# Patient Record
Sex: FEMALE | Race: WHITE | URBAN - METROPOLITAN AREA
[De-identification: names, ages, dates, MRNs, and addresses within clinical notes are randomized per-mention and may not be internally consistent; named-entity substitution may affect disease eponyms.]

---

## 2023-05-24 ENCOUNTER — HOSPITAL ENCOUNTER (OUTPATIENT)
Age: 63
Setting detail: SPECIMEN
Discharge: HOME | End: 2023-05-24

## 2023-05-24 LAB
BASOPHILS # BLD: 0.05 K/UL (ref 0–0.2)
BASOPHILS NFR BLD: 1 % (ref 0–2)
BILIRUB UR QL STRIP: NEGATIVE
CLARITY UR: CLEAR
COLOR UR: YELLOW
COMMENT UA: ABNORMAL
EOSINOPHIL # BLD: 0.17 K/UL (ref 0–0.44)
EOSINOPHILS RELATIVE PERCENT: 2 % (ref 1–4)
ERYTHROCYTE [DISTWIDTH] IN BLOOD BY AUTOMATED COUNT: 14.2 % (ref 11.8–14.4)
GLUCOSE UR STRIP-MCNC: NEGATIVE MG/DL
HCT VFR BLD AUTO: 43.7 % (ref 36.3–47.1)
HGB BLD-MCNC: 13.6 G/DL (ref 11.9–15.1)
HGB UR QL STRIP.AUTO: NEGATIVE
IMM GRANULOCYTES # BLD AUTO: 0.03 K/UL (ref 0–0.3)
IMM GRANULOCYTES NFR BLD: 0 %
KETONES UR STRIP-MCNC: NEGATIVE MG/DL
LEUKOCYTE ESTERASE UR QL STRIP: NEGATIVE
LYMPHOCYTES # BLD: 30 % (ref 24–43)
LYMPHOCYTES NFR BLD: 2.25 K/UL (ref 1.1–3.7)
MCH RBC QN AUTO: 29.5 PG (ref 25.2–33.5)
MCHC RBC AUTO-ENTMCNC: 31.1 G/DL (ref 28.4–34.8)
MCV RBC AUTO: 94.8 FL (ref 82.6–102.9)
MONOCYTES NFR BLD: 0.68 K/UL (ref 0.1–1.2)
MONOCYTES NFR BLD: 9 % (ref 3–12)
NEUTROPHILS NFR BLD: 58 % (ref 36–65)
NEUTS SEG NFR BLD: 4.31 K/UL (ref 1.5–8.1)
NITRITE UR QL STRIP: NEGATIVE
NRBC AUTOMATED: 0 PER 100 WBC
PH UR STRIP: 8 [PH] (ref 5–8)
PLATELET # BLD AUTO: 301 K/UL (ref 138–453)
PMV BLD AUTO: 11.5 FL (ref 8.1–13.5)
PROT UR STRIP-MCNC: NEGATIVE MG/DL
RBC # BLD AUTO: 4.61 M/UL (ref 3.95–5.11)
SP GR UR STRIP: 1 (ref 1–1.03)
UROBILINOGEN UR STRIP-ACNC: NORMAL
WBC OTHER # BLD: 7.5 K/UL (ref 3.5–11.3)

## 2023-05-25 LAB
ALBUMIN SERPL-MCNC: 4.6 G/DL (ref 3.5–5.2)
ALBUMIN/GLOB SERPL: 1.3 {RATIO} (ref 1–2.5)
ALP SERPL-CCNC: 78 U/L (ref 35–104)
ALT SERPL-CCNC: 36 U/L (ref 5–33)
ANION GAP SERPL CALCULATED.3IONS-SCNC: 22 MMOL/L (ref 9–17)
AST SERPL-CCNC: 35 U/L
BILIRUB SERPL-MCNC: 0.4 MG/DL (ref 0.3–1.2)
BUN SERPL-MCNC: 12 MG/DL (ref 8–23)
CALCIUM SERPL-MCNC: 10.3 MG/DL (ref 8.6–10.4)
CHLORIDE SERPL-SCNC: 106 MMOL/L (ref 98–107)
CO2 SERPL-SCNC: 18 MMOL/L (ref 20–31)
CREAT SERPL-MCNC: 0.72 MG/DL (ref 0.5–0.9)
GFR SERPL CREATININE-BSD FRML MDRD: >60 ML/MIN/1.73M2
GLUCOSE SERPL-MCNC: 86 MG/DL (ref 70–99)
POTASSIUM SERPL-SCNC: 4.8 MMOL/L (ref 3.7–5.3)
PROT SERPL-MCNC: 8.2 G/DL (ref 6.4–8.3)
SODIUM SERPL-SCNC: 146 MMOL/L (ref 135–144)

## 2023-07-10 ENCOUNTER — OFFICE VISIT (OUTPATIENT)
Age: 63
End: 2023-07-10
Payer: COMMERCIAL

## 2023-07-10 DIAGNOSIS — R35.0 FREQUENCY OF URINATION: ICD-10-CM

## 2023-07-10 DIAGNOSIS — N39.41 URGE INCONTINENCE OF URINE: Primary | ICD-10-CM

## 2023-07-10 LAB
BILIRUBIN, POC: NORMAL
BLOOD URINE, POC: NORMAL
CLARITY, POC: CLEAR
COLOR, POC: YELLOW
GLUCOSE URINE, POC: NORMAL
KETONES, POC: NORMAL
LEUKOCYTE EST, POC: NORMAL
NITRITE, POC: NORMAL
PH, POC: NORMAL
POST VOID RESIDUAL (PVR): 0 ML
PROTEIN, POC: NORMAL
SPECIFIC GRAVITY, POC: NORMAL
UROBILINOGEN, POC: NORMAL

## 2023-07-10 PROCEDURE — 51798 US URINE CAPACITY MEASURE: CPT | Performed by: SPECIALIST

## 2023-07-10 PROCEDURE — 99204 OFFICE O/P NEW MOD 45 MIN: CPT | Performed by: SPECIALIST

## 2023-07-10 PROCEDURE — 81003 URINALYSIS AUTO W/O SCOPE: CPT | Performed by: SPECIALIST

## 2023-07-10 RX ORDER — BUPROPION HYDROCHLORIDE 150 MG/1
1 TABLET ORAL EVERY MORNING
COMMUNITY
Start: 2022-06-15

## 2023-07-10 RX ORDER — GUAIFENESIN, PSEUDOEPHEDRINE HYDROCHLORIDE 600; 60 MG/1; MG/1
TABLET, EXTENDED RELEASE ORAL
COMMUNITY

## 2023-07-10 RX ORDER — OXYBUTYNIN CHLORIDE 5 MG/1
TABLET ORAL
COMMUNITY
Start: 2023-06-08 | End: 2023-07-10

## 2023-07-10 RX ORDER — ESTROGEN,CON/M-PROGEST ACET 0.3-1.5MG
TABLET ORAL
COMMUNITY

## 2023-07-10 NOTE — PROGRESS NOTES
Vania Lambert Godinezyriselisa, [address] [place] [address] Urology Consultation / New Patient Visit    Patient:  Xochitl Ignacio  YOB: 1960  Date: 7/10/2023  Consult requested from Marycruz Bazzi MD  for purpose of evaluation of Urge urinary incontinence. HISTORY OF PRESENT ILLNESS:   The patient is a 65 y.o. female who presents today for evaluation of the following problems:   Urge Urinary Incontinence:  Patient is here today for urge urinary incontinence which started 6 month(s) ago. Recently the urinary incontinence symptoms: are worsening  Current medical Rx for urge urinary incontinence: none  Urge Incontinence:  Severity = moderate. Stress incontinence: Severity = not present. Number of pads per day: 2-3  Lower urinary tract symptoms: urgency, frequency, hesitancy, decreased urinary stream, and incomplete emptying.    Today's AUA Symptom Score (QOL): 14 (5)  (Patient's old records have been requested, reviewed and summarized in today's note: 6/8/23 office note of Marycruz Bazzi MD)    Summary of old records:   Urge urinary incontinence: 2-3 ppd, can't tolerate oxybutynin (dry mouth, blisters); PVR = 0 mL; needs voiding diary; samples of Myrbetriq 50 mg po qd 7/10/23  6/15/23 CT neg     Procedures Today: Postvoid Residual:  Post-void Residual by bladder scanner: 0 mL (7/10/23)     Urinalysis today:  Results for POC orders placed in visit on 07/10/23   POCT Urinalysis No Micro (Auto)   Result Value Ref Range    Color, UA yellow     Clarity, UA clear     Glucose, UA POC neg     Bilirubin, UA      Ketones, UA      Spec Grav, UA      Blood, UA POC neg     pH, UA      Protein, UA POC trace     Urobilinogen, UA      Leukocytes, UA trace     Nitrite, UA neg        Last BUN and creatinine:  Lab Results   Component Value Date    BUN 12 05/24/2023     Lab Results   Component Value Date    CREATININE 0.72 05/24/2023       Last CBC:  Lab Results   Component Value Date    WBC 7.5 05/24/2023    HGB 13.6

## 2023-08-30 ENCOUNTER — OFFICE VISIT (OUTPATIENT)
Age: 63
End: 2023-08-30
Payer: COMMERCIAL

## 2023-08-30 VITALS — HEIGHT: 68 IN | BODY MASS INDEX: 31.52 KG/M2 | WEIGHT: 208 LBS

## 2023-08-30 DIAGNOSIS — R35.0 FREQUENCY OF URINATION: ICD-10-CM

## 2023-08-30 DIAGNOSIS — N39.41 URGE INCONTINENCE OF URINE: Primary | ICD-10-CM

## 2023-08-30 LAB
BILIRUBIN, POC: NORMAL
BLOOD URINE, POC: NORMAL
CLARITY, POC: CLEAR
COLOR, POC: YELLOW
GLUCOSE URINE, POC: NORMAL
KETONES, POC: NORMAL
LEUKOCYTE EST, POC: NORMAL
NITRITE, POC: NORMAL
PH, POC: NORMAL
PROTEIN, POC: NORMAL
SPECIFIC GRAVITY, POC: NORMAL
UROBILINOGEN, POC: NORMAL

## 2023-08-30 PROCEDURE — 99213 OFFICE O/P EST LOW 20 MIN: CPT | Performed by: SPECIALIST

## 2023-08-30 PROCEDURE — 81003 URINALYSIS AUTO W/O SCOPE: CPT | Performed by: SPECIALIST

## 2023-08-30 RX ORDER — METRONIDAZOLE 500 MG/1
500 TABLET ORAL 2 TIMES DAILY
Qty: 14 TABLET | Refills: 0 | COMMUNITY
Start: 2023-08-29 | End: 2023-09-05

## 2023-08-30 RX ORDER — CLOTRIMAZOLE AND BETAMETHASONE DIPROPIONATE 10; .64 MG/G; MG/G
CREAM TOPICAL 2 TIMES DAILY
COMMUNITY
Start: 2023-08-11

## 2023-08-30 RX ORDER — ESTRADIOL 10 UG/1
INSERT VAGINAL
COMMUNITY
Start: 2023-08-11

## 2023-08-30 RX ORDER — FLUCONAZOLE 200 MG/1
200 TABLET ORAL EVERY OTHER DAY
COMMUNITY
Start: 2023-08-29 | End: 2023-09-03

## 2023-08-30 RX ORDER — L. ACIDOPHILUS/PECTIN, CITRUS 25MM-100MG
TABLET ORAL
COMMUNITY

## 2023-11-16 ENCOUNTER — OFFICE VISIT (OUTPATIENT)
Age: 63
End: 2023-11-16

## 2023-11-16 VITALS
RESPIRATION RATE: 14 BRPM | DIASTOLIC BLOOD PRESSURE: 80 MMHG | SYSTOLIC BLOOD PRESSURE: 120 MMHG | TEMPERATURE: 98.2 F | OXYGEN SATURATION: 97 % | BODY MASS INDEX: 32.42 KG/M2 | HEART RATE: 70 BPM | WEIGHT: 210.13 LBS

## 2023-11-16 DIAGNOSIS — B35.4 TINEA CORPORIS: Primary | ICD-10-CM

## 2023-11-16 SDOH — ECONOMIC STABILITY: FOOD INSECURITY: WITHIN THE PAST 12 MONTHS, THE FOOD YOU BOUGHT JUST DIDN'T LAST AND YOU DIDN'T HAVE MONEY TO GET MORE.: NEVER TRUE

## 2023-11-16 SDOH — ECONOMIC STABILITY: INCOME INSECURITY: HOW HARD IS IT FOR YOU TO PAY FOR THE VERY BASICS LIKE FOOD, HOUSING, MEDICAL CARE, AND HEATING?: NOT HARD AT ALL

## 2023-11-16 SDOH — ECONOMIC STABILITY: FOOD INSECURITY: WITHIN THE PAST 12 MONTHS, YOU WORRIED THAT YOUR FOOD WOULD RUN OUT BEFORE YOU GOT MONEY TO BUY MORE.: NEVER TRUE

## 2023-11-16 SDOH — ECONOMIC STABILITY: HOUSING INSECURITY
IN THE LAST 12 MONTHS, WAS THERE A TIME WHEN YOU DID NOT HAVE A STEADY PLACE TO SLEEP OR SLEPT IN A SHELTER (INCLUDING NOW)?: NO

## 2023-11-16 ASSESSMENT — PATIENT HEALTH QUESTIONNAIRE - PHQ9
SUM OF ALL RESPONSES TO PHQ QUESTIONS 1-9: 0
1. LITTLE INTEREST OR PLEASURE IN DOING THINGS: 0
SUM OF ALL RESPONSES TO PHQ QUESTIONS 1-9: 0
SUM OF ALL RESPONSES TO PHQ9 QUESTIONS 1 & 2: 0
2. FEELING DOWN, DEPRESSED OR HOPELESS: 0

## 2023-11-16 NOTE — PROGRESS NOTES
will  apply Loprox cream twice a day until resolved and then as needed basis. Keep area dry after sports activity    COMMUNICATION:       Electronically signed by Laya Raza MD on 11/16/2023 at 4:31 PM    Portion of this note were dictated using Dragon speech recognition software. It has been reviewed for accuracy, but may contain grammatical and clerical errors.

## 2024-01-02 ENCOUNTER — TELEPHONE (OUTPATIENT)
Age: 64
End: 2024-01-02

## 2024-01-02 NOTE — TELEPHONE ENCOUNTER
50 mg is a the highest dose of Myrbetriq.  Alternatives are anticholinergic overactive bladder meds but these caused a lot of side effects.  Could consider Cystoscopy and transvesical Botox or the Interstim sacral neuromodulation device.  Can set up OV to discuss.

## 2024-01-02 NOTE — TELEPHONE ENCOUNTER
Pt called into office today asking if there was a stronger dose of mirabegron (MYRBETRIQ) 50 MG due to her starting SPROVATO treatment which makes her OAB symptoms worse. I explained 50 mg was the highest dose please advise

## 2024-02-01 ENCOUNTER — OFFICE VISIT (OUTPATIENT)
Age: 64
End: 2024-02-01
Payer: COMMERCIAL

## 2024-02-01 VITALS — WEIGHT: 210 LBS | BODY MASS INDEX: 32.96 KG/M2 | HEIGHT: 67 IN

## 2024-02-01 DIAGNOSIS — N39.41 URGE INCONTINENCE OF URINE: Primary | ICD-10-CM

## 2024-02-01 DIAGNOSIS — Z01.818 PRE-OP EVALUATION: Primary | ICD-10-CM

## 2024-02-01 DIAGNOSIS — R35.0 FREQUENCY OF URINATION: ICD-10-CM

## 2024-02-01 LAB
BILIRUBIN, POC: NORMAL
BLOOD URINE, POC: NORMAL
CLARITY, POC: CLEAR
COLOR, POC: YELLOW
GLUCOSE URINE, POC: NORMAL
KETONES, POC: NORMAL
LEUKOCYTE EST, POC: NORMAL
NITRITE, POC: NORMAL
PH, POC: NORMAL
POST VOID RESIDUAL (PVR): NORMAL ML
PROTEIN, POC: NORMAL
SPECIFIC GRAVITY, POC: NORMAL
UROBILINOGEN, POC: NORMAL

## 2024-02-01 PROCEDURE — 81003 URINALYSIS AUTO W/O SCOPE: CPT | Performed by: SPECIALIST

## 2024-02-01 PROCEDURE — 99214 OFFICE O/P EST MOD 30 MIN: CPT | Performed by: SPECIALIST

## 2024-02-01 PROCEDURE — 93000 ELECTROCARDIOGRAM COMPLETE: CPT | Performed by: SPECIALIST

## 2024-02-01 PROCEDURE — 51798 US URINE CAPACITY MEASURE: CPT | Performed by: SPECIALIST

## 2024-02-01 RX ORDER — ESKETAMINE HYDROCHLORIDE 28 MG/.2ML
SOLUTION NASAL
COMMUNITY
Start: 2023-12-17

## 2024-02-01 RX ORDER — TRAZODONE HYDROCHLORIDE 50 MG/1
50 TABLET ORAL NIGHTLY
COMMUNITY
Start: 2024-01-01

## 2024-02-01 NOTE — PROGRESS NOTES
Arun Aden MD FACS    [facility] Urology Office Progress Note    Patient:  Fabiana Ignacio  YOB: 1960  Date: 2/1/2024    HISTORY OF PRESENT ILLNESS:   The patient is a 63 y.o. female  Patient Urge urinary incontinence is worse despite Myrbetriq 50 mg po qd for OAB symptoms.  She is now taking Sporvato and she attributes the worsening to this medication.  She is now back to using 3-4 pads per day.  Will proceed with Cystoscopy and transvesical Botox (100 IU) under MAC.  Lower urinary tract symptoms: urgency, frequency, hesitancy, and nocturia, 1 times per night   Last AUA Symptom Score (QOL): 9 (4)  Today's AUA Symptom Score (QOL): 15 (4)    Summary of old records:   Urge urinary incontinence: 2-3 ppd, can't tolerate oxybutynin (dry mouth, blisters); PVR = 0 mL; Myrbetriq 50 mg po qd 7/10/23 (<1 ppd); worse after starting Sporvato; wants Botox  6/15/23 CT neg     Additional History: none    Procedures Today: Postvoid Residual:  Post-void Residual by bladder scanner: 5 mL      Urinalysis today:  Results for POC orders placed in visit on 02/01/24   POCT Urinalysis No Micro (Auto)   Result Value Ref Range    Color, UA yellow     Clarity, UA clear     Glucose, UA POC neg     Bilirubin, UA      Ketones, UA      Spec Grav, UA      Blood, UA POC neg     pH, UA      Protein, UA POC neg     Urobilinogen, UA      Leukocytes, UA trace     Nitrite, UA neg        Last BUN and creatinine:  Lab Results   Component Value Date    BUN 12 05/24/2023     Lab Results   Component Value Date    CREATININE 0.72 05/24/2023       Last CBC:  Lab Results   Component Value Date    WBC 7.5 05/24/2023    HGB 13.6 05/24/2023    HCT 43.7 05/24/2023    MCV 94.8 05/24/2023     05/24/2023       Additional Lab/Culture results: none    Imaging Reviewed during this Office Visit: none  (results were independently reviewed by physician and radiology report verified)    Physical Exam:    There were no vitals filed for

## 2024-02-16 NOTE — PROGRESS NOTES
DAY OF SURGERY/PROCEDURE  GUIDELINES    As a patient at the [facility], you can expect quality medical and nursing care that is centered on your individual needs. It is our goal to make your surgical experience as comfortable and excellent as possible.  ________________________________________________________________________    The following instructions are general guidelines, if any information on this sheet is different from what your doctor has instructed you to do, please follow your doctor's instructions.    Please arrive on 2/21/2024 2 0830      Enter through entrance C. Check in at registration     Upon arrival you will be taken to the pre-operative area to get ready for surgery, your family will stay in the waiting room and visit with you once you are ready for surgery. Due to special limitations please limit visitation to 1-2 members of your family at a time. When it is time for surgery your family will return to the waiting room.    Nothing to eat, drink, smoke, suck or chew after midnight (no water, gum, mints, cigarettes, cigars, pipes, snuff, chewing tobacco, etc.) or your surgery may be canceled.     Take a shower or bath on the morning of your surgery/procedure (Hibiclens if directed) Do not apply any lotions.    Brush your teeth, but do not swallow any water    IN CASE OF ILLNESS - If you have a cold or flu symptoms (high fever, runny nose, sore throat, cough, etc.) rash, nausea, vomiting, loose stools, and/or recent contact with someone who has a contagious disease (chick pox, measles, etc.) please call your doctor before coming to the surgery center    Take a small sip of water with heart, blood pressure, and/or seizure medication the morning of surgery.     If applicable bring your:  Inhaler (s)  Hearing aid(s)  Eyeglasses and Case (If you wear contacts they have to be removed before surgery, bring case and solution)  CPAP     DO NOT take anticoagulants (blood thinners,

## 2024-02-18 ENCOUNTER — HOSPITAL ENCOUNTER (OUTPATIENT)
Age: 64
Discharge: HOME | End: 2024-02-18
Payer: COMMERCIAL

## 2024-02-18 PROCEDURE — 93005 ELECTROCARDIOGRAM TRACING: CPT | Performed by: SPECIALIST

## 2024-02-20 ENCOUNTER — ANESTHESIA EVENT (OUTPATIENT)
Dept: OPERATING ROOM | Age: 64
End: 2024-02-20
Payer: COMMERCIAL

## 2024-02-21 ENCOUNTER — HOSPITAL ENCOUNTER (OUTPATIENT)
Age: 64
Setting detail: OUTPATIENT SURGERY
Discharge: HOME | End: 2024-02-21
Attending: SPECIALIST | Admitting: SPECIALIST
Payer: COMMERCIAL

## 2024-02-21 ENCOUNTER — ANESTHESIA (OUTPATIENT)
Dept: OPERATING ROOM | Age: 64
End: 2024-02-21
Payer: COMMERCIAL

## 2024-02-21 VITALS
SYSTOLIC BLOOD PRESSURE: 144 MMHG | WEIGHT: 208.6 LBS | RESPIRATION RATE: 12 BRPM | HEART RATE: 87 BPM | OXYGEN SATURATION: 100 % | DIASTOLIC BLOOD PRESSURE: 90 MMHG | BODY MASS INDEX: 31.61 KG/M2 | HEIGHT: 68 IN | TEMPERATURE: 96.1 F

## 2024-02-21 DIAGNOSIS — N39.41 URGE URINARY INCONTINENCE: ICD-10-CM

## 2024-02-21 PROCEDURE — 6360000002 HC RX W HCPCS: Performed by: SPECIALIST

## 2024-02-21 PROCEDURE — 7100000010 HC PHASE II RECOVERY - FIRST 15 MIN: Performed by: SPECIALIST

## 2024-02-21 PROCEDURE — 3700000000 HC ANESTHESIA ATTENDED CARE: Performed by: SPECIALIST

## 2024-02-21 PROCEDURE — 2580000003 HC RX 258: Performed by: ANESTHESIOLOGY

## 2024-02-21 PROCEDURE — 87077 CULTURE AEROBIC IDENTIFY: CPT

## 2024-02-21 PROCEDURE — 3600000012 HC SURGERY LEVEL 2 ADDTL 15MIN: Performed by: SPECIALIST

## 2024-02-21 PROCEDURE — 87086 URINE CULTURE/COLONY COUNT: CPT

## 2024-02-21 PROCEDURE — 2709999900 HC NON-CHARGEABLE SUPPLY: Performed by: SPECIALIST

## 2024-02-21 PROCEDURE — 7100000011 HC PHASE II RECOVERY - ADDTL 15 MIN: Performed by: SPECIALIST

## 2024-02-21 PROCEDURE — 3700000001 HC ADD 15 MINUTES (ANESTHESIA): Performed by: SPECIALIST

## 2024-02-21 PROCEDURE — 52287 CYSTOSCOPY CHEMODENERVATION: CPT | Performed by: SPECIALIST

## 2024-02-21 PROCEDURE — 3600000002 HC SURGERY LEVEL 2 BASE: Performed by: SPECIALIST

## 2024-02-21 PROCEDURE — 6360000002 HC RX W HCPCS: Performed by: NURSE ANESTHETIST, CERTIFIED REGISTERED

## 2024-02-21 PROCEDURE — 2500000003 HC RX 250 WO HCPCS: Performed by: NURSE ANESTHETIST, CERTIFIED REGISTERED

## 2024-02-21 RX ORDER — OXYCODONE HYDROCHLORIDE 5 MG/1
5 TABLET ORAL PRN
Status: DISCONTINUED | OUTPATIENT
Start: 2024-02-21 | End: 2024-02-21 | Stop reason: HOSPADM

## 2024-02-21 RX ORDER — METOCLOPRAMIDE HYDROCHLORIDE 5 MG/ML
10 INJECTION INTRAMUSCULAR; INTRAVENOUS
Status: DISCONTINUED | OUTPATIENT
Start: 2024-02-21 | End: 2024-02-21 | Stop reason: HOSPADM

## 2024-02-21 RX ORDER — MEPERIDINE HYDROCHLORIDE 50 MG/ML
12.5 INJECTION INTRAMUSCULAR; INTRAVENOUS; SUBCUTANEOUS ONCE
Status: DISCONTINUED | OUTPATIENT
Start: 2024-02-21 | End: 2024-02-21 | Stop reason: HOSPADM

## 2024-02-21 RX ORDER — DIPHENHYDRAMINE HYDROCHLORIDE 50 MG/ML
12.5 INJECTION INTRAMUSCULAR; INTRAVENOUS
Status: DISCONTINUED | OUTPATIENT
Start: 2024-02-21 | End: 2024-02-21 | Stop reason: HOSPADM

## 2024-02-21 RX ORDER — MORPHINE SULFATE 2 MG/ML
1 INJECTION, SOLUTION INTRAMUSCULAR; INTRAVENOUS EVERY 5 MIN PRN
Status: DISCONTINUED | OUTPATIENT
Start: 2024-02-21 | End: 2024-02-21 | Stop reason: HOSPADM

## 2024-02-21 RX ORDER — SODIUM CHLORIDE, SODIUM LACTATE, POTASSIUM CHLORIDE, CALCIUM CHLORIDE 600; 310; 30; 20 MG/100ML; MG/100ML; MG/100ML; MG/100ML
INJECTION, SOLUTION INTRAVENOUS CONTINUOUS
Status: DISCONTINUED | OUTPATIENT
Start: 2024-02-21 | End: 2024-02-21 | Stop reason: HOSPADM

## 2024-02-21 RX ORDER — CIPROFLOXACIN 2 MG/ML
INJECTION, SOLUTION INTRAVENOUS
Status: COMPLETED
Start: 2024-02-21 | End: 2024-02-21

## 2024-02-21 RX ORDER — CIPROFLOXACIN 500 MG/1
500 TABLET, FILM COATED ORAL 2 TIMES DAILY
Qty: 6 TABLET | Refills: 0 | Status: SHIPPED | OUTPATIENT
Start: 2024-02-21

## 2024-02-21 RX ORDER — SODIUM CHLORIDE 9 MG/ML
INJECTION, SOLUTION INTRAVENOUS PRN
Status: DISCONTINUED | OUTPATIENT
Start: 2024-02-21 | End: 2024-02-21 | Stop reason: HOSPADM

## 2024-02-21 RX ORDER — GLYCOPYRROLATE 1 MG/5 ML
SYRINGE (ML) INTRAVENOUS PRN
Status: DISCONTINUED | OUTPATIENT
Start: 2024-02-21 | End: 2024-02-21 | Stop reason: SDUPTHER

## 2024-02-21 RX ORDER — HYDRALAZINE HYDROCHLORIDE 20 MG/ML
10 INJECTION INTRAMUSCULAR; INTRAVENOUS
Status: DISCONTINUED | OUTPATIENT
Start: 2024-02-21 | End: 2024-02-21 | Stop reason: HOSPADM

## 2024-02-21 RX ORDER — MIDAZOLAM HYDROCHLORIDE 2 MG/2ML
2 INJECTION, SOLUTION INTRAMUSCULAR; INTRAVENOUS
Status: DISCONTINUED | OUTPATIENT
Start: 2024-02-21 | End: 2024-02-21 | Stop reason: HOSPADM

## 2024-02-21 RX ORDER — LIDOCAINE HYDROCHLORIDE 10 MG/ML
1 INJECTION, SOLUTION EPIDURAL; INFILTRATION; INTRACAUDAL; PERINEURAL
Status: DISCONTINUED | OUTPATIENT
Start: 2024-02-21 | End: 2024-02-21 | Stop reason: HOSPADM

## 2024-02-21 RX ORDER — PROPOFOL 10 MG/ML
INJECTION, EMULSION INTRAVENOUS CONTINUOUS PRN
Status: DISCONTINUED | OUTPATIENT
Start: 2024-02-21 | End: 2024-02-21 | Stop reason: SDUPTHER

## 2024-02-21 RX ORDER — CIPROFLOXACIN 2 MG/ML
400 INJECTION, SOLUTION INTRAVENOUS ONCE
Status: COMPLETED | OUTPATIENT
Start: 2024-02-21 | End: 2024-02-21

## 2024-02-21 RX ORDER — SODIUM CHLORIDE 0.9 % (FLUSH) 0.9 %
5-40 SYRINGE (ML) INJECTION EVERY 12 HOURS SCHEDULED
Status: DISCONTINUED | OUTPATIENT
Start: 2024-02-21 | End: 2024-02-21 | Stop reason: HOSPADM

## 2024-02-21 RX ORDER — LABETALOL HYDROCHLORIDE 5 MG/ML
10 INJECTION, SOLUTION INTRAVENOUS
Status: DISCONTINUED | OUTPATIENT
Start: 2024-02-21 | End: 2024-02-21 | Stop reason: HOSPADM

## 2024-02-21 RX ORDER — LIDOCAINE HYDROCHLORIDE 10 MG/ML
INJECTION, SOLUTION INFILTRATION; PERINEURAL PRN
Status: DISCONTINUED | OUTPATIENT
Start: 2024-02-21 | End: 2024-02-21 | Stop reason: SDUPTHER

## 2024-02-21 RX ORDER — PROPOFOL 10 MG/ML
INJECTION, EMULSION INTRAVENOUS PRN
Status: DISCONTINUED | OUTPATIENT
Start: 2024-02-21 | End: 2024-02-21 | Stop reason: SDUPTHER

## 2024-02-21 RX ORDER — OXYCODONE HYDROCHLORIDE 5 MG/1
10 TABLET ORAL PRN
Status: DISCONTINUED | OUTPATIENT
Start: 2024-02-21 | End: 2024-02-21 | Stop reason: HOSPADM

## 2024-02-21 RX ORDER — SODIUM CHLORIDE 0.9 % (FLUSH) 0.9 %
5-40 SYRINGE (ML) INJECTION PRN
Status: DISCONTINUED | OUTPATIENT
Start: 2024-02-21 | End: 2024-02-21 | Stop reason: HOSPADM

## 2024-02-21 RX ORDER — SODIUM CHLORIDE 9 MG/ML
INJECTION, SOLUTION INTRAMUSCULAR; INTRAVENOUS; SUBCUTANEOUS
Status: DISCONTINUED
Start: 2024-02-21 | End: 2024-02-21 | Stop reason: HOSPADM

## 2024-02-21 RX ORDER — ONDANSETRON 2 MG/ML
4 INJECTION INTRAMUSCULAR; INTRAVENOUS
Status: DISCONTINUED | OUTPATIENT
Start: 2024-02-21 | End: 2024-02-21 | Stop reason: HOSPADM

## 2024-02-21 RX ADMIN — Medication 0.2 MG: at 10:12

## 2024-02-21 RX ADMIN — LIDOCAINE HYDROCHLORIDE 50 MG: 10 INJECTION, SOLUTION INFILTRATION; PERINEURAL at 10:12

## 2024-02-21 RX ADMIN — PROPOFOL 150 MCG/KG/MIN: 10 INJECTION, EMULSION INTRAVENOUS at 10:12

## 2024-02-21 RX ADMIN — CIPROFLOXACIN 400 MG: 2 INJECTION, SOLUTION INTRAVENOUS at 10:13

## 2024-02-21 RX ADMIN — SODIUM CHLORIDE, POTASSIUM CHLORIDE, SODIUM LACTATE AND CALCIUM CHLORIDE: 600; 310; 30; 20 INJECTION, SOLUTION INTRAVENOUS at 09:26

## 2024-02-21 RX ADMIN — PROPOFOL 100 MG: 10 INJECTION, EMULSION INTRAVENOUS at 10:12

## 2024-02-21 ASSESSMENT — PAIN - FUNCTIONAL ASSESSMENT
PAIN_FUNCTIONAL_ASSESSMENT: NONE - DENIES PAIN
PAIN_FUNCTIONAL_ASSESSMENT: 0-10

## 2024-02-21 NOTE — DISCHARGE INSTRUCTIONS
Encourage patient to drink at least 64 ounces of water a day.  Reassure patient it is common to see gross hematuria and this can last a few days.  No driving for 24 hours.  Telly Aden M.D.'s office will reach out to patient to set up a follow up  in 3 weeks to reassess symptoms and check Postvoid Residual .  Call office if fever > 101, shaking chills, inability to void.  See Rx for antibiotics.  Patient may use OTC Azo 1 po tid prn bladder pain or burning with urination.

## 2024-02-21 NOTE — OP NOTE
Operative Note      Patient: Fabiana Ignacio  YOB: 1960  MRN: [id number]    Date of Procedure: 2/21/2024    Pre-Op Diagnosis Codes:     * Urge urinary incontinence [N39.41]    Post-Op Diagnosis: Same       Procedure(s):  CYSTOSCOPY INJECTION BOTOX 100 UNITS    Surgeon(s):  Arun Aden MD    Assistant:   * No surgical staff found *    Anesthesia: Monitor Anesthesia Care    Estimated Blood Loss (mL): Minimal    Complications: None    Specimens:   ID Type Source Tests Collected by Time Destination   1 : URINE CULTURE Urine Urine, straight catheter CULTURE, URINE Arun Aden MD 2/21/2024 1020        Implants:  * No implants in log *      Drains: * No LDAs found *    Findings: see below     Detailed Description of Procedure:   NDICATIONS:  This is a 63 y.o. female presents today for a cystoscopy and transvesical Botox injection to treat medically refractory urge urinary incontinence.  After risks, benefits and alternatives of the procedure were discussed with the patient, informed consent was obtained and the patient elected to proceed.  The patient was given Cipro 400 mg IV on call to OR for antibiotic prophylaxis. Patient had EPC cuffs placed for VTE prophylaxis.    DETAILS OF PROCEDURE:  The patient was brought back to the operating room.  She was prepped and draped in usual sterile surgical fashion after being placed in dorsal lithotomy position.  A timeout was taken per protocol with everyone in agreement. The 22F rigid cystoscope was assembled using a 30 degree lens and passed per the urethra. The urethra was normal without any lesions. Urine sent for C&S to rule out infection. The bladder was entered with ease and inspected thoroughly and systematically which did not show any lesions or tumors, stone, fistulous tracts, diverticula. Both ureteral orifices were normal with clear efflux of urine. Once within the bladder the injection needle was advanced and purged of air.  A total of 100

## 2024-02-21 NOTE — H&P
[facility]  Arun Aden MD FACS    History and Physical    Patient:  Fabiana Ignacio  MRN: [id number]  YOB: 1960    CHIEF COMPLAINT:  Urge urinary incontinence     HISTORY OF PRESENT ILLNESS:   The patient is a 63 y.o. female who presents with:  Patient Urge urinary incontinence is worse despite Myrbetriq 50 mg po qd for OAB symptoms.  She is now taking Sporvato and she attributes the worsening to this medication.  She is now back to using 3-4 pads per day.  Patient here for a Cystoscopy and transvesical Botox (100 IU) under MAC.  Lower urinary tract symptoms: urgency, frequency, hesitancy, and nocturia, 1 times per night   Last AUA Symptom Score (QOL): 9 (4)  Today's AUA Symptom Score (QOL): 15 (4)     Summary of old records:   Urge urinary incontinence: 2-3 ppd, can't tolerate oxybutynin (dry mouth, blisters); PVR = 0 mL; Myrbetriq 50 mg po qd 7/10/23 (<1 ppd); worse after starting Sporvato; wants Botox  6/15/23 CT neg     Patient's old records, notes and chart reviewed and summarized above.    Past Medical History:    Past Medical History:   Diagnosis Date    Asthma     Basal cell adenocarcinoma     Constipation     Depression     Incontinence     Prolonged emergence from general anesthesia     Sinusitis     Urinary frequency        Past Surgical History:    Past Surgical History:   Procedure Laterality Date    BASAL CELL CARCINOMA EXCISION       SECTION      SINUS SURGERY      x3       Medications Prior to Admission:    Prior to Admission medications    Medication Sig Start Date End Date Taking? Authorizing Provider   SPRAVATO, 56 MG DOSE, 28 MG/DEVICE nasal solution SPRAY 28MG IN BILATERAL NARES FOR A TOTAL OF 56MG TWICE WEEKLY 23   Yaquelin Garcia MD   traZODone (DESYREL) 50 MG tablet Take 1 tablet by mouth nightly at bedtime.  Patient not taking: Reported on 2024   Yaquelin Garcia MD   ciclopirox (LOPROX) 0.77 % cream Apply topically  Lack of Transportation (Non-Medical): No   Physical Activity: Not on file   Stress: Not on file   Social Connections: Not on file   Intimate Partner Violence: Not on file   Housing Stability: Unknown (11/16/2023)    Housing Stability Vital Sign     Unable to Pay for Housing in the Last Year: Not on file     Number of Places Lived in the Last Year: Not on file     Unstable Housing in the Last Year: No       Family History:  History reviewed. No pertinent family history.    REVIEW OF SYSTEMS:  All reviewed and negative except for pertinent ones listed in HPI.    Physical Exam:      This a 63 y.o. female   No data found.  Constitutional: Patient in no acute distress.  Neuro: Alert and oriented to person, place and time.  Psych: mood and affect normal  HEENT negative  Lungs: Respiratory effort is normal; CTA  Cardiovascular: Normal peripheral pulses; R3 wo murmur  Abdomen: Soft, non-tender, non-distended with no CVA, flank pain    LABS:   No results for input(s): \"WBC\", \"HGB\", \"HCT\", \"MCV\", \"PLT\" in the last 72 hours.  No results for input(s): \"NA\", \"K\", \"CL\", \"CO2\", \"PHOS\", \"BUN\", \"CREATININE\", \"CA\" in the last 72 hours.    Additional Lab/culture results:    Urinalysis: No results for input(s): \"COLORU\", \"PHUR\", \"LABCAST\", \"WBCUA\", \"RBCUA\", \"MUCUS\", \"TRICHOMONAS\", \"YEAST\", \"BACTERIA\", \"CLARITYU\", \"SPECGRAV\", \"LEUKOCYTESUR\", \"UROBILINOGEN\", \"BILIRUBINUR\", \"BLOODU\" in the last 72 hours.    Invalid input(s): \"NITRATE\", \"GLUCOSEUKETONESUAMORPHOUS\"     -----------------------------------------------------------------  Imaging Results:      Assessment and Plan   Impression:    Patient Active Problem List   Diagnosis    Urge incontinence of urine    Frequency of urination    Tinea corporis       Plan: Cystoscopy and transvesical Botox (100 IU) under MAC.    Arun Aden MD  9:06 AM 2/21/2024

## 2024-02-21 NOTE — ANESTHESIA POSTPROCEDURE EVALUATION
Department of Anesthesiology  Postprocedure Note    Patient: Fabiana Ignacio  MRN: [id number]  YOB: 1960  Date of evaluation: 2/21/2024    Procedure Summary       Date: 02/21/24 Room / Location: [address]    Anesthesia Start: 1008 Anesthesia Stop: 1030    Procedure: CYSTOSCOPY INJECTION BOTOX 100 UNITS Diagnosis:       Urge urinary incontinence      (Urge urinary incontinence [N39.41])    Surgeons: Arun Aden MD Responsible Provider: Laura Hope MD    Anesthesia Type: MAC ASA Status: 2            Anesthesia Type: No value filed.    Kiara Phase I: Kiara Score: 10    Kiara Phase II: Kiara Score: 10    Anesthesia Post Evaluation    Patient location during evaluation: PACU  Patient participation: complete - patient participated  Level of consciousness: awake and alert  Airway patency: patent  Nausea & Vomiting: no nausea and no vomiting  Cardiovascular status: blood pressure returned to baseline  Respiratory status: acceptable and room air  Hydration status: euvolemic  Pain management: adequate and satisfactory to patient    No notable events documented.

## 2024-02-23 LAB
MICROORGANISM SPEC CULT: NORMAL
SPECIMEN DESCRIPTION: NORMAL

## 2024-02-24 LAB
EKG ATRIAL RATE: 81 BPM
EKG P AXIS: 58 DEGREES
EKG P-R INTERVAL: 168 MS
EKG Q-T INTERVAL: 390 MS
EKG QRS DURATION: 90 MS
EKG QTC CALCULATION (BAZETT): 453 MS
EKG R AXIS: 9 DEGREES
EKG T AXIS: 41 DEGREES
EKG VENTRICULAR RATE: 81 BPM

## 2024-03-11 ENCOUNTER — OFFICE VISIT (OUTPATIENT)
Age: 64
End: 2024-03-11
Payer: COMMERCIAL

## 2024-03-11 DIAGNOSIS — N39.41 URGE INCONTINENCE OF URINE: Primary | ICD-10-CM

## 2024-03-11 DIAGNOSIS — R35.0 FREQUENCY OF URINATION: ICD-10-CM

## 2024-03-11 LAB
BILIRUBIN, POC: NORMAL
BLOOD URINE, POC: NORMAL
CLARITY, POC: CLEAR
COLOR, POC: YELLOW
GLUCOSE URINE, POC: NORMAL
KETONES, POC: NORMAL
LEUKOCYTE EST, POC: NORMAL
NITRITE, POC: NORMAL
PH, POC: NORMAL
POST VOID RESIDUAL (PVR): 149 ML
PROTEIN, POC: NORMAL
SPECIFIC GRAVITY, POC: NORMAL
UROBILINOGEN, POC: NORMAL

## 2024-03-11 PROCEDURE — 51798 US URINE CAPACITY MEASURE: CPT | Performed by: SPECIALIST

## 2024-03-11 PROCEDURE — G8417 CALC BMI ABV UP PARAM F/U: HCPCS | Performed by: SPECIALIST

## 2024-03-11 PROCEDURE — 1036F TOBACCO NON-USER: CPT | Performed by: SPECIALIST

## 2024-03-11 PROCEDURE — 81003 URINALYSIS AUTO W/O SCOPE: CPT | Performed by: SPECIALIST

## 2024-03-11 PROCEDURE — 99213 OFFICE O/P EST LOW 20 MIN: CPT | Performed by: SPECIALIST

## 2024-03-11 PROCEDURE — 3017F COLORECTAL CA SCREEN DOC REV: CPT | Performed by: SPECIALIST

## 2024-03-11 PROCEDURE — G8427 DOCREV CUR MEDS BY ELIG CLIN: HCPCS | Performed by: SPECIALIST

## 2024-03-11 PROCEDURE — G8484 FLU IMMUNIZE NO ADMIN: HCPCS | Performed by: SPECIALIST

## 2024-03-11 RX ORDER — ESTROGEN,CON/M-PROGEST ACET 0.45-1.5MG
1 TABLET ORAL EVERY MORNING
COMMUNITY
Start: 2024-02-28

## 2024-03-11 RX ORDER — ESKETAMINE HYDROCHLORIDE 28 MG/.2ML
SOLUTION NASAL
COMMUNITY
Start: 2024-03-05

## 2024-03-11 RX ORDER — BUPROPION HYDROCHLORIDE 300 MG/1
300 TABLET ORAL DAILY
COMMUNITY
Start: 2024-02-26

## 2024-03-11 NOTE — PROGRESS NOTES
Arun Aden MD [facility] Urology Office Progress Note    Patient:  Fabiana Ignacio  YOB: 1960  Date: 3/11/2024    HISTORY OF PRESENT ILLNESS:   The patient is a 63 y.o. female  Patient doing well after 2/21/24 Cystoscopy and transvesical Botox (100 IU) Rx.  She is now using 0 pads per day.  Her Postvoid Residual today was 149 mL.  She was told she could wean off and stop Myrbetriq 50 mg po qd for OAB symptoms.  Will plan to repeat a Cystoscopy and transvesical Botox (100 IU) in mid-August 2024 to maintain the clinical effect.   Lower urinary tract symptoms: urgency, frequency, hesitancy, and decreased urinary stream   Last AUA Symptom Score (QOL): 15 (4)  Today's AUA Symptom Score (QOL): 7 (3)    Summary of old records:   Urge urinary incontinence: 2-3 ppd, can't tolerate oxybutynin (dry mouth, blisters); PVR = 0 mL; Myrbetriq 50 mg po qd 7/10/23 (<1 ppd); worse after starting Sporvato; 2/21/24 Botox (100 IU)  6/15/23 CT neg     Additional History: none    Procedures Today: Postvoid Residual:  Post-void Residual by bladder scanner: 149 mL      Urinalysis today:  Results for POC orders placed in visit on 03/11/24   POCT Urinalysis No Micro (Auto)   Result Value Ref Range    Color, UA yellow     Clarity, UA clear     Glucose, UA POC neg     Bilirubin, UA      Ketones, UA      Spec Grav, UA      Blood, UA POC neg     pH, UA      Protein, UA POC neg     Urobilinogen, UA      Leukocytes, UA small     Nitrite, UA neg        Last BUN and creatinine:  Lab Results   Component Value Date    BUN 12 05/24/2023     Lab Results   Component Value Date    CREATININE 0.72 05/24/2023       Last CBC:  Lab Results   Component Value Date    WBC 7.5 05/24/2023    HGB 13.6 05/24/2023    HCT 43.7 05/24/2023    MCV 94.8 05/24/2023     05/24/2023       Additional Lab/Culture results: Urine Culture 2/21/24      Component    Specimen Description .URINE,STRAIGHT CATHETER   Culture NO SIGNIFICANT

## 2024-04-30 RX ORDER — MIRABEGRON 50 MG/1
50 TABLET, FILM COATED, EXTENDED RELEASE ORAL DAILY
Qty: 30 TABLET | Refills: 3 | Status: SHIPPED | OUTPATIENT
Start: 2024-04-30

## 2024-05-21 ENCOUNTER — OFFICE VISIT (OUTPATIENT)
Age: 64
End: 2024-05-21
Payer: COMMERCIAL

## 2024-05-21 VITALS
SYSTOLIC BLOOD PRESSURE: 128 MMHG | OXYGEN SATURATION: 99 % | HEART RATE: 75 BPM | HEIGHT: 68 IN | TEMPERATURE: 98 F | BODY MASS INDEX: 30.77 KG/M2 | DIASTOLIC BLOOD PRESSURE: 82 MMHG | WEIGHT: 203 LBS

## 2024-05-21 DIAGNOSIS — K21.9 GASTROESOPHAGEAL REFLUX DISEASE WITHOUT ESOPHAGITIS: Primary | ICD-10-CM

## 2024-05-21 DIAGNOSIS — K59.01 CONSTIPATION BY DELAYED COLONIC TRANSIT: ICD-10-CM

## 2024-05-21 PROCEDURE — 99213 OFFICE O/P EST LOW 20 MIN: CPT | Performed by: FAMILY MEDICINE

## 2024-05-21 PROCEDURE — 3017F COLORECTAL CA SCREEN DOC REV: CPT | Performed by: FAMILY MEDICINE

## 2024-05-21 PROCEDURE — 1036F TOBACCO NON-USER: CPT | Performed by: FAMILY MEDICINE

## 2024-05-21 PROCEDURE — G8417 CALC BMI ABV UP PARAM F/U: HCPCS | Performed by: FAMILY MEDICINE

## 2024-05-21 PROCEDURE — G8427 DOCREV CUR MEDS BY ELIG CLIN: HCPCS | Performed by: FAMILY MEDICINE

## 2024-05-21 RX ORDER — PANTOPRAZOLE SODIUM 40 MG/1
40 TABLET, DELAYED RELEASE ORAL
Qty: 30 TABLET | Refills: 1 | Status: SHIPPED | OUTPATIENT
Start: 2024-05-21

## 2024-05-21 ASSESSMENT — PATIENT HEALTH QUESTIONNAIRE - PHQ9
SUM OF ALL RESPONSES TO PHQ9 QUESTIONS 1 & 2: 0
SUM OF ALL RESPONSES TO PHQ QUESTIONS 1-9: 0
1. LITTLE INTEREST OR PLEASURE IN DOING THINGS: NOT AT ALL
SUM OF ALL RESPONSES TO PHQ QUESTIONS 1-9: 0
2. FEELING DOWN, DEPRESSED OR HOPELESS: NOT AT ALL
SUM OF ALL RESPONSES TO PHQ QUESTIONS 1-9: 0
SUM OF ALL RESPONSES TO PHQ QUESTIONS 1-9: 0

## 2024-05-21 NOTE — PROGRESS NOTES
[facility]     Date of Visit:  2024  Patient Name: Fabiana Ignacio   Patient :  1960     CHIEF COMPLAINT/HPI:     Fabiana Ignacio is a 63 y.o. female who presents today for an general visit to be evaluated for the following condition(s):  Chief Complaint   Patient presents with    GI Problem     Over the last couple 3 weeks patient has been bothered by constipation and also GERD symptoms.  No vomiting not much in the way of nausea no significant abdominal pain.  She did have problems with burping up branch water type material last week but it seems better this week she has been taking Tums  REVIEW OF SYSTEM      Review of Systems  No fever no sweats no chills no chest pain or shortness of breath no dysphagia    REVIEWED INFORMATION      Allergies   Allergen Reactions    Aspirin Other (See Comments)    Azithromycin Hives    Cephalexin     Ibuprofen Other (See Comments)    Naproxen Other (See Comments)    Sulfamethoxazole-Trimethoprim        Current Outpatient Medications   Medication Sig Dispense Refill    pantoprazole (PROTONIX) 40 MG tablet Take 1 tablet by mouth every morning (before breakfast) 30 tablet 1    buPROPion (WELLBUTRIN XL) 300 MG extended release tablet Take 1 tablet by mouth daily      PREMPRO 0.45-1.5 MG per tablet Take 1 tablet by mouth every morning      SPRAVATO, 84 MG DOSE, 28 MG/DEVICE SOPK nasal solution SPRAY 28MG X 3 IN BOTH NOSTRILS FOR 84MG DOSE TWICE WEEKLY      clotrimazole-betamethasone (LOTRISONE) 1-0.05 % cream Apply topically 2 times daily APPLY TO AFFECTED AREA (Patient not taking: Reported on 2024)      Lactobacillus Acid-Pectin (ACIDOPHILUS/CITRUS PECTIN) TABS Take by mouth 3 times daily (with meals) (Patient not taking: Reported on 2024)       No current facility-administered medications for this visit.        Patient Active Problem List   Diagnosis    Urge incontinence of urine    Frequency of urination    Tinea corporis       Past Medical History:

## 2024-05-28 ENCOUNTER — TELEPHONE (OUTPATIENT)
Age: 64
End: 2024-05-28

## 2024-05-28 ENCOUNTER — HOSPITAL ENCOUNTER (OUTPATIENT)
Age: 64
Setting detail: SPECIMEN
Discharge: HOME | End: 2024-05-28

## 2024-05-28 DIAGNOSIS — N39.0 RECURRENT UTI: Primary | ICD-10-CM

## 2024-05-28 DIAGNOSIS — N39.0 RECURRENT UTI: ICD-10-CM

## 2024-05-28 RX ORDER — NITROFURANTOIN 25; 75 MG/1; MG/1
100 CAPSULE ORAL 2 TIMES DAILY
Qty: 14 CAPSULE | Refills: 0 | Status: SHIPPED | OUTPATIENT
Start: 2024-05-28

## 2024-05-28 NOTE — TELEPHONE ENCOUNTER
Pt called into office and stated she thinks she has a uti. Writer ordered a urine c&s pt is going to go to a [facility] lab today. Her pharmacy is [id number] - [facility] [address] - P [phone] - F [phone] [[id number]]

## 2024-05-29 LAB
MICROORGANISM SPEC CULT: NO GROWTH
SPECIMEN DESCRIPTION: NORMAL

## 2024-06-05 ENCOUNTER — OFFICE VISIT (OUTPATIENT)
Age: 64
End: 2024-06-05
Payer: COMMERCIAL

## 2024-06-05 VITALS
TEMPERATURE: 97.6 F | SYSTOLIC BLOOD PRESSURE: 130 MMHG | DIASTOLIC BLOOD PRESSURE: 82 MMHG | HEIGHT: 67 IN | HEART RATE: 88 BPM | BODY MASS INDEX: 31.96 KG/M2 | WEIGHT: 203.6 LBS | OXYGEN SATURATION: 97 %

## 2024-06-05 DIAGNOSIS — K59.01 CONSTIPATION BY DELAYED COLONIC TRANSIT: Primary | ICD-10-CM

## 2024-06-05 DIAGNOSIS — K21.9 GASTROESOPHAGEAL REFLUX DISEASE WITHOUT ESOPHAGITIS: ICD-10-CM

## 2024-06-05 PROCEDURE — 1036F TOBACCO NON-USER: CPT | Performed by: FAMILY MEDICINE

## 2024-06-05 PROCEDURE — 3017F COLORECTAL CA SCREEN DOC REV: CPT | Performed by: FAMILY MEDICINE

## 2024-06-05 PROCEDURE — G8427 DOCREV CUR MEDS BY ELIG CLIN: HCPCS | Performed by: FAMILY MEDICINE

## 2024-06-05 PROCEDURE — 99213 OFFICE O/P EST LOW 20 MIN: CPT | Performed by: FAMILY MEDICINE

## 2024-06-05 PROCEDURE — G8417 CALC BMI ABV UP PARAM F/U: HCPCS | Performed by: FAMILY MEDICINE

## 2024-06-05 RX ORDER — SENNA AND DOCUSATE SODIUM 50; 8.6 MG/1; MG/1
2 TABLET, FILM COATED ORAL
Qty: 60 TABLET | Refills: 3 | Status: SHIPPED | OUTPATIENT
Start: 2024-06-05 | End: 2024-07-05

## 2024-06-05 RX ORDER — METRONIDAZOLE 500 MG/1
500 TABLET ORAL 2 TIMES DAILY
COMMUNITY
Start: 2024-05-30 | End: 2024-06-06

## 2024-06-05 NOTE — PROGRESS NOTES
Medications    sennosides-docusate sodium (SENOKOT-S) 8.6-50 MG tablet     Sig: Take 2 tablets by mouth nightly     Dispense:  60 tablet     Refill:  3   Patient will stop MiraLAX and Dulcolax switch over to senna S to a bedtime after a few days if not effective take 2 twice a day.  Return here in 6 weeks at that time we will cut back on Protonix    Return in about 6 weeks (around 7/17/2024).    COMMUNICATION:       Electronically signed by Norman Mckinnon MD on 6/5/2024 at 3:15 PM    Portion of this note were dictated using Dragon speech recognition software. It has been reviewed for accuracy, but may contain grammatical and clerical errors.

## 2024-07-19 ENCOUNTER — OFFICE VISIT (OUTPATIENT)
Age: 64
End: 2024-07-19
Payer: COMMERCIAL

## 2024-07-19 VITALS
SYSTOLIC BLOOD PRESSURE: 130 MMHG | DIASTOLIC BLOOD PRESSURE: 70 MMHG | WEIGHT: 202.2 LBS | TEMPERATURE: 97.8 F | HEART RATE: 84 BPM | BODY MASS INDEX: 31.67 KG/M2 | OXYGEN SATURATION: 99 %

## 2024-07-19 DIAGNOSIS — K59.01 CONSTIPATION BY DELAYED COLONIC TRANSIT: Primary | ICD-10-CM

## 2024-07-19 DIAGNOSIS — J40 BRONCHITIS: ICD-10-CM

## 2024-07-19 PROCEDURE — 3017F COLORECTAL CA SCREEN DOC REV: CPT | Performed by: FAMILY MEDICINE

## 2024-07-19 PROCEDURE — G8427 DOCREV CUR MEDS BY ELIG CLIN: HCPCS | Performed by: FAMILY MEDICINE

## 2024-07-19 PROCEDURE — G8417 CALC BMI ABV UP PARAM F/U: HCPCS | Performed by: FAMILY MEDICINE

## 2024-07-19 PROCEDURE — 99214 OFFICE O/P EST MOD 30 MIN: CPT | Performed by: FAMILY MEDICINE

## 2024-07-19 PROCEDURE — 1036F TOBACCO NON-USER: CPT | Performed by: FAMILY MEDICINE

## 2024-07-19 RX ORDER — DOXYCYCLINE HYCLATE 100 MG
100 TABLET ORAL 2 TIMES DAILY
Qty: 20 TABLET | Refills: 0 | Status: SHIPPED | OUTPATIENT
Start: 2024-07-19 | End: 2024-07-29

## 2024-07-19 RX ORDER — METHYLPREDNISOLONE 4 MG/1
TABLET ORAL
Qty: 1 KIT | Refills: 0 | Status: SHIPPED | OUTPATIENT
Start: 2024-07-19

## 2024-07-19 RX ORDER — BENZONATATE 200 MG/1
200 CAPSULE ORAL 3 TIMES DAILY PRN
Qty: 30 CAPSULE | Refills: 2 | Status: SHIPPED | OUTPATIENT
Start: 2024-07-19 | End: 2024-07-26

## 2024-07-19 RX ORDER — PANTOPRAZOLE SODIUM 20 MG/1
20 TABLET, DELAYED RELEASE ORAL
Qty: 90 TABLET | Refills: 1 | Status: SHIPPED | OUTPATIENT
Start: 2024-07-19

## 2024-07-19 NOTE — PROGRESS NOTES
Dispense:  20 tablet     Refill:  0    benzonatate (TESSALON) 200 MG capsule     Sig: Take 1 capsule by mouth 3 times daily as needed for Cough     Dispense:  30 capsule     Refill:  2    methylPREDNISolone (MEDROL DOSEPACK) 4 MG tablet     Sig: Take by mouth as directed on pack     Dispense:  1 kit     Refill:  0    pantoprazole (PROTONIX) 20 MG tablet     Sig: Take 1 tablet by mouth every morning (before breakfast)     Dispense:  90 tablet     Refill:  1       Return in about 2 weeks (around 8/2/2024).    COMMUNICATION:       Electronically signed by Norman Mckinnon MD on 7/19/2024 at 3:08 PM    Portion of this note were dictated using Dragon speech recognition software. It has been reviewed for accuracy, but may contain grammatical and clerical errors.

## 2024-07-23 ENCOUNTER — HOSPITAL ENCOUNTER (OUTPATIENT)
Dept: GENERAL RADIOLOGY | Age: 64
Discharge: HOME | End: 2024-07-25
Payer: COMMERCIAL

## 2024-07-23 ENCOUNTER — OFFICE VISIT (OUTPATIENT)
Dept: FAMILY MEDICINE CLINIC | Age: 64
End: 2024-07-23
Payer: COMMERCIAL

## 2024-07-23 ENCOUNTER — HOSPITAL ENCOUNTER (OUTPATIENT)
Age: 64
Discharge: HOME | End: 2024-07-25
Payer: COMMERCIAL

## 2024-07-23 VITALS
WEIGHT: 201.8 LBS | SYSTOLIC BLOOD PRESSURE: 124 MMHG | OXYGEN SATURATION: 99 % | BODY MASS INDEX: 31.61 KG/M2 | RESPIRATION RATE: 12 BRPM | HEART RATE: 81 BPM | DIASTOLIC BLOOD PRESSURE: 90 MMHG

## 2024-07-23 DIAGNOSIS — M25.472 ANKLE SWELLING, LEFT: ICD-10-CM

## 2024-07-23 DIAGNOSIS — M25.572 ACUTE LEFT ANKLE PAIN: ICD-10-CM

## 2024-07-23 DIAGNOSIS — S93.402A SPRAIN OF LEFT ANKLE, UNSPECIFIED LIGAMENT, INITIAL ENCOUNTER: Primary | ICD-10-CM

## 2024-07-23 DIAGNOSIS — S93.402A SPRAIN OF LEFT ANKLE, UNSPECIFIED LIGAMENT, INITIAL ENCOUNTER: ICD-10-CM

## 2024-07-23 PROCEDURE — G8417 CALC BMI ABV UP PARAM F/U: HCPCS | Performed by: NURSE PRACTITIONER

## 2024-07-23 PROCEDURE — 3017F COLORECTAL CA SCREEN DOC REV: CPT | Performed by: NURSE PRACTITIONER

## 2024-07-23 PROCEDURE — 73610 X-RAY EXAM OF ANKLE: CPT

## 2024-07-23 PROCEDURE — 99214 OFFICE O/P EST MOD 30 MIN: CPT | Performed by: NURSE PRACTITIONER

## 2024-07-23 PROCEDURE — G8427 DOCREV CUR MEDS BY ELIG CLIN: HCPCS | Performed by: NURSE PRACTITIONER

## 2024-07-23 PROCEDURE — 1036F TOBACCO NON-USER: CPT | Performed by: NURSE PRACTITIONER

## 2024-07-23 NOTE — PROGRESS NOTES
[facility]  [address]  [address]  [facility] [address]  Dept: [phone]  Dept Fax: [phone]    Fabiana Ignacio is a 63 y.o. female who presents to the urgent care today for her medical conditions/complaints as notedbelow.  Fabiana Ignacio is c/o of Foot Injury (possible sprained foot/ankle, missed a step twisted ankle x1 day )      HPI:     63 yr old female presents for left foot dorsum and left lateral ankle pain with swelling after missed concrete step off when [place] around 10 pm last night, unsure exactly how she landed on it  Did not fall down  Hurts to walk on it  Has sprained it in past - tried elevation and her neoprene ankle brace  Nsaids cause facial swelling  Took a leftover tylenol #3 last night and a reg tylenol this morning  Came in to make sure ok    Ankle Pain   The incident occurred 12 to 24 hours ago. Incident location: tennis courts. The injury mechanism is unknown. The pain is present in the left ankle and left foot. The quality of the pain is described as aching. The pain is moderate. The pain has been Constant since onset. Pertinent negatives include no inability to bear weight, loss of motion, loss of sensation, muscle weakness, numbness or tingling. She reports no foreign bodies present. The symptoms are aggravated by movement, palpation and weight bearing. She has tried elevation, immobilization and acetaminophen for the symptoms. The treatment provided mild relief.       Past Medical History:   Diagnosis Date    Asthma     Basal cell adenocarcinoma     Constipation     Depression     Incontinence     Prolonged emergence from general anesthesia     Sinusitis     Urinary frequency         Current Outpatient Medications   Medication Sig Dispense Refill    doxycycline hyclate (VIBRA-TABS) 100 MG tablet Take 1 tablet by mouth 2 times daily for 10 days 20 tablet 0    benzonatate

## 2024-08-01 ENCOUNTER — OFFICE VISIT (OUTPATIENT)
Age: 64
End: 2024-08-01
Payer: COMMERCIAL

## 2024-08-01 VITALS
OXYGEN SATURATION: 99 % | HEART RATE: 80 BPM | BODY MASS INDEX: 30.55 KG/M2 | DIASTOLIC BLOOD PRESSURE: 80 MMHG | WEIGHT: 201.6 LBS | TEMPERATURE: 97.6 F | SYSTOLIC BLOOD PRESSURE: 124 MMHG | HEIGHT: 68 IN

## 2024-08-01 DIAGNOSIS — J45.909 MILD REACTIVE AIRWAYS DISEASE, UNSPECIFIED WHETHER PERSISTENT: Primary | ICD-10-CM

## 2024-08-01 PROCEDURE — 99213 OFFICE O/P EST LOW 20 MIN: CPT | Performed by: FAMILY MEDICINE

## 2024-08-01 PROCEDURE — 3017F COLORECTAL CA SCREEN DOC REV: CPT | Performed by: FAMILY MEDICINE

## 2024-08-01 PROCEDURE — G8417 CALC BMI ABV UP PARAM F/U: HCPCS | Performed by: FAMILY MEDICINE

## 2024-08-01 PROCEDURE — G8427 DOCREV CUR MEDS BY ELIG CLIN: HCPCS | Performed by: FAMILY MEDICINE

## 2024-08-01 PROCEDURE — 1036F TOBACCO NON-USER: CPT | Performed by: FAMILY MEDICINE

## 2024-08-01 RX ORDER — METHYLPREDNISOLONE 4 MG/1
TABLET ORAL
Qty: 1 KIT | Refills: 0 | Status: SHIPPED | OUTPATIENT
Start: 2024-08-01

## 2024-08-01 RX ORDER — FLUTICASONE PROPIONATE AND SALMETEROL 250; 50 UG/1; UG/1
1 POWDER RESPIRATORY (INHALATION) EVERY 12 HOURS
Qty: 60 EACH | Refills: 3 | Status: SHIPPED | OUTPATIENT
Start: 2024-08-01

## 2024-08-01 RX ORDER — MONTELUKAST SODIUM 10 MG/1
10 TABLET ORAL DAILY
Qty: 30 TABLET | Refills: 3 | Status: SHIPPED | OUTPATIENT
Start: 2024-08-01

## 2024-08-01 NOTE — PROGRESS NOTES
XR CHEST STANDARD (2 VW); Future       Orders Placed This Encounter   Medications    methylPREDNISolone (MEDROL DOSEPACK) 4 MG tablet     Sig: Take by mouth as directed on pack     Dispense:  1 kit     Refill:  0    montelukast (SINGULAIR) 10 MG tablet     Sig: Take 1 tablet by mouth daily     Dispense:  30 tablet     Refill:  3    fluticasone-salmeterol (ADVAIR) 250-50 MCG/ACT AEPB diskus inhaler     Sig: Inhale 1 puff into the lungs in the morning and 1 puff in the evening.     Dispense:  60 each     Refill:  3       Patient will take singular Medrol Dosepak which worked well Advair 1 puff twice daily return here in 1 month if symptoms have not resolved also obtain chest x-ray    COMMUNICATION:       Electronically signed by Norman Mckinnon MD on 8/1/2024 at 8:15 PM    Portion of this note were dictated using Dragon speech recognition software. It has been reviewed for accuracy, but may contain grammatical and clerical errors.

## 2024-08-01 NOTE — PATIENT INSTRUCTIONS
Fabiana    Thank you for choosing [facility].  We know you have options when it comes to your healthcare; we appreciate that you chose us. Our goal is to provide exceptional  service and world class care to every patient.  You will be receiving a survey via email or text message asking for your feedback.  Please take a few minutes to share your thoughts about your recent visit. Your comments help us understand what we do well and ways we can improve.  Thank you in advance for your valuable feedback.      Dr. SAL Villagran

## 2024-10-17 ENCOUNTER — OFFICE VISIT (OUTPATIENT)
Age: 64
End: 2024-10-17
Payer: COMMERCIAL

## 2024-10-17 VITALS
SYSTOLIC BLOOD PRESSURE: 136 MMHG | BODY MASS INDEX: 31.1 KG/M2 | OXYGEN SATURATION: 97 % | HEIGHT: 68 IN | WEIGHT: 205.2 LBS | HEART RATE: 77 BPM | DIASTOLIC BLOOD PRESSURE: 88 MMHG | TEMPERATURE: 97.3 F

## 2024-10-17 DIAGNOSIS — M62.838 TRAPEZIUS MUSCLE SPASM: Primary | ICD-10-CM

## 2024-10-17 DIAGNOSIS — M79.609 PARESTHESIA AND PAIN OF RIGHT EXTREMITY: ICD-10-CM

## 2024-10-17 DIAGNOSIS — R20.2 PARESTHESIA AND PAIN OF RIGHT EXTREMITY: ICD-10-CM

## 2024-10-17 PROCEDURE — 1036F TOBACCO NON-USER: CPT | Performed by: STUDENT IN AN ORGANIZED HEALTH CARE EDUCATION/TRAINING PROGRAM

## 2024-10-17 PROCEDURE — 99213 OFFICE O/P EST LOW 20 MIN: CPT | Performed by: STUDENT IN AN ORGANIZED HEALTH CARE EDUCATION/TRAINING PROGRAM

## 2024-10-17 PROCEDURE — G8427 DOCREV CUR MEDS BY ELIG CLIN: HCPCS | Performed by: STUDENT IN AN ORGANIZED HEALTH CARE EDUCATION/TRAINING PROGRAM

## 2024-10-17 PROCEDURE — G8484 FLU IMMUNIZE NO ADMIN: HCPCS | Performed by: STUDENT IN AN ORGANIZED HEALTH CARE EDUCATION/TRAINING PROGRAM

## 2024-10-17 PROCEDURE — G8417 CALC BMI ABV UP PARAM F/U: HCPCS | Performed by: STUDENT IN AN ORGANIZED HEALTH CARE EDUCATION/TRAINING PROGRAM

## 2024-10-17 PROCEDURE — 3017F COLORECTAL CA SCREEN DOC REV: CPT | Performed by: STUDENT IN AN ORGANIZED HEALTH CARE EDUCATION/TRAINING PROGRAM

## 2024-10-17 RX ORDER — CYCLOBENZAPRINE HCL 5 MG
5 TABLET ORAL 2 TIMES DAILY PRN
Qty: 20 TABLET | Refills: 0 | Status: SHIPPED | OUTPATIENT
Start: 2024-10-17 | End: 2024-10-27

## 2024-10-17 ASSESSMENT — ENCOUNTER SYMPTOMS
DIARRHEA: 0
CONSTIPATION: 0
SHORTNESS OF BREATH: 0
VOMITING: 0
SORE THROAT: 0
NAUSEA: 0
RHINORRHEA: 0
CHEST TIGHTNESS: 0

## 2024-10-17 NOTE — PROGRESS NOTES
compression test.  Negative Tinel's.  5 out of 5 strength and sensation in the upper extremities bilaterally.   Lymphadenopathy:      Cervical: No cervical adenopathy.   Skin:     General: Skin is warm and dry.      Capillary Refill: Capillary refill takes less than 2 seconds.      Findings: No rash.   Neurological:      General: No focal deficit present.      Mental Status: She is alert and oriented to person, place, and time.      Sensory: No sensory deficit.      Motor: No weakness.      Gait: Gait normal.   Psychiatric:         Mood and Affect: Mood normal.         Behavior: Behavior normal.         Thought Content: Thought content normal.         Judgment: Judgment normal.           ASSESSMENT/PLAN     1. Trapezius muscle spasm  -     XR CERVICAL SPINE (2-3 VIEWS); Future  -     Ambulatory referral to Family Practice  -     EMG; Future  -     cyclobenzaprine (FLEXERIL) 5 MG tablet; Take 1 tablet by mouth 2 times daily as needed for Muscle spasms, Disp-20 tablet, R-0Normal  -     Ambulatory referral to Physical Therapy  2. Paresthesia and pain of right extremity  -     EMG; Future  -     Ambulatory referral to Physical Therapy     At this time I think checking an EMG is reasonable.  I will also get an x-ray of the neck and a referral for physical therapy and OMT.   I will refill the muscle relaxer.   She will call for any new or worsening symptoms but I expect her to improve with this conservative treatment.         If any labs/imaging/testing is ordered then patient is informed that no news is not always good news and that s/he should call the office within 1 week if s/he does not hear from us regarding the results of his/her testing.  Pt understands his/her responsibility in following up on the results. S/he is agreeable to this plan.       COMMUNICATION:       PLEASE NOTE THAT ANY DISCONTINUATION OF MEDICATIONS OR MEDICAL SUPPLIES REFLECTED IN TODAY'S VISIT SUMMARY  MAY NOT HAVE BEEN COMPLETED AS A CHANGE IN

## 2024-10-30 ENCOUNTER — HOSPITAL ENCOUNTER (OUTPATIENT)
Age: 64
Setting detail: THERAPIES SERIES
Discharge: HOME | End: 2024-10-30
Attending: STUDENT IN AN ORGANIZED HEALTH CARE EDUCATION/TRAINING PROGRAM
Payer: COMMERCIAL

## 2024-10-30 PROCEDURE — 97161 PT EVAL LOW COMPLEX 20 MIN: CPT

## 2024-10-30 NOTE — CONSULTS
(limitations, restrictions) [x] 1-2 [] 3 [] 4+   Clinical presentation (progression) [x] Stable [] Evolving  [] Unstable   Decision Making [x] Low [] Moderate [] High    [x] Low Complexity [] Moderate Complexity [] High Complexity        Treatment Charges: Mins Units Time In/Out   [x] Evaluation       [x]  Low       []  Moderate       []  High 47 1    []  Modalities        []  Ther Exercise      []  Neuromuscular Re-ed      []  Gait Training      []  Manual Therapy      []  Ther Activities      []  Aquatics      []  Vasocompression      []  Cervical Traction      []  Other      Total Billable time 47 min 1        Time in: 4:33 pm      Time out: 5:20 pm    Electronically signed by: Edwar Banegas PT        Physician Signature:________________________________Date:__________________  By signing above or cosigning this note, I have reviewed this plan of care and certify a need for medically necessary rehabilitation services.     *PLEASE SIGN ABOVE AND FAX BACK ALL PAGES*

## 2024-11-06 ENCOUNTER — HOSPITAL ENCOUNTER (OUTPATIENT)
Age: 64
Setting detail: THERAPIES SERIES
Discharge: HOME | End: 2024-11-06
Attending: STUDENT IN AN ORGANIZED HEALTH CARE EDUCATION/TRAINING PROGRAM
Payer: COMMERCIAL

## 2024-11-06 PROCEDURE — 97110 THERAPEUTIC EXERCISES: CPT

## 2024-11-06 PROCEDURE — 97012 MECHANICAL TRACTION THERAPY: CPT

## 2024-11-06 NOTE — FLOWSHEET NOTE
sleeping, and carrying objects.          Patient goals: \"Get to the source of the problem and alleviate symptoms\"       Pt. Education:  [x] Plans/Goals, Risks/Benefits discussed  [x] Home exercise program  Method of Education: [x] Verbal  [x] Demo  [] Written  Comprehension of Education:  [] Verbalizes understanding.  [] Demonstrates understanding.  [x] Needs Review.  [] Demonstrates/verbalizes understanding of HEP/Ed previously given.  *see flowsheet for exercises for home     Plan: [x] Continue per plan of care.   [] Other:      Treatment Charges: Mins Units Time in/out   [x]  Modalities: traction 10 1    [x]  Ther Exercise 27 2    []  Manual Therapy      []  Ther Activities      []  Aquatics      []  Neuromuscular      [] Vasocompression      [] Gait Training      [] Dry needling        [] 1 or 2 muscles        [] 3 or more muscles      []  Other      Total Treatment time 37 3      Time In:5:12 pm         5:39)   Time Out: 5:54 pm    Electronically signed by:  Edwar Banegas, PT

## 2024-11-07 ENCOUNTER — APPOINTMENT (OUTPATIENT)
Age: 64
End: 2024-11-07
Attending: STUDENT IN AN ORGANIZED HEALTH CARE EDUCATION/TRAINING PROGRAM
Payer: COMMERCIAL

## 2024-11-13 ENCOUNTER — HOSPITAL ENCOUNTER (OUTPATIENT)
Age: 64
Setting detail: THERAPIES SERIES
Discharge: HOME | End: 2024-11-13
Attending: STUDENT IN AN ORGANIZED HEALTH CARE EDUCATION/TRAINING PROGRAM
Payer: COMMERCIAL

## 2024-11-13 RX ORDER — MONTELUKAST SODIUM 10 MG/1
10 TABLET ORAL DAILY
Qty: 30 TABLET | Refills: 5 | Status: SHIPPED | OUTPATIENT
Start: 2024-11-13

## 2024-11-13 NOTE — TELEPHONE ENCOUNTER
Fabiana HICKS Self is calling to request a refill on the following medication(s):    Medication Request:  Requested Prescriptions     Pending Prescriptions Disp Refills    montelukast (SINGULAIR) 10 MG tablet [Pharmacy Med Name: MONTELUKAST 10MG TABLETS] 30 tablet 3     Sig: TAKE 1 TABLET BY MOUTH DAILY       Last Visit Date (If Applicable):  8/1/2024    Next Visit Date:    Visit date not found

## 2024-11-13 NOTE — FLOWSHEET NOTE
[] [facility]  Outpatient Rehabilitation & Therapy  [address]   [place], [place] [address]       Physical Therapy Cancel/No Show note    Date: 2024  Patient: Fabiana Ignacio  : 1960  MRN: [id number]      Cancels/No Shows to date:     For today's appointment patient:    [x]  Cancelled    [] Rescheduled appointment    [] No-show     Reason given by patient:    []  Patient ill    []  Conflicting appointment    [] No transportation      [] Conflict with work    [] No reason given    [] Weather related    [] COVID-19    [x] Other:      Comments:  No reason given      [x] Next appointment was confirmed    Electronically signed by: Karuna Muro PTA

## 2024-11-14 ENCOUNTER — HOSPITAL ENCOUNTER (OUTPATIENT)
Age: 64
Setting detail: THERAPIES SERIES
Discharge: HOME | End: 2024-11-14
Attending: STUDENT IN AN ORGANIZED HEALTH CARE EDUCATION/TRAINING PROGRAM
Payer: COMMERCIAL

## 2024-11-14 PROCEDURE — 97110 THERAPEUTIC EXERCISES: CPT

## 2024-11-14 PROCEDURE — 97140 MANUAL THERAPY 1/> REGIONS: CPT

## 2024-11-14 PROCEDURE — 97012 MECHANICAL TRACTION THERAPY: CPT

## 2024-11-21 ENCOUNTER — HOSPITAL ENCOUNTER (OUTPATIENT)
Age: 64
Setting detail: THERAPIES SERIES
Discharge: HOME | End: 2024-11-21
Attending: STUDENT IN AN ORGANIZED HEALTH CARE EDUCATION/TRAINING PROGRAM
Payer: COMMERCIAL

## 2024-11-21 PROCEDURE — 97140 MANUAL THERAPY 1/> REGIONS: CPT

## 2024-11-21 PROCEDURE — 97012 MECHANICAL TRACTION THERAPY: CPT

## 2024-11-21 PROCEDURE — 97110 THERAPEUTIC EXERCISES: CPT

## 2024-11-21 NOTE — FLOWSHEET NOTE
with trigger point release     Scap mobs completed             Corner stretch  3x15\"    added 11/6             Massage gun right trap/scap  8' without gun                           Mech cerv traction with heat  15'  25 lbs 40\"on/10\" off  added 11/6   Other:    Specific Instructions for next treatment:  Monitor response to today's session and home program compliance.  Advance program to patient tolerance.      Assessment: [x] Progressing toward goals. Patient reports pain decreasing slightly, although still functinoally limited due to stiffness and pain in neck/ shoulders. She did note relief with STM and scap mobs (several trigger point areas noted in anamaria UT/ med scap border area. Good tolerance to stretching (denied increase in pain today).Pt tolerated session well with no c/o of increased pain at end of session.     [] No change.        [] Other:    [x] Patient would continue to benefit from skilled physical therapy services in order to: decrease pain/tingling, improve ROM/strength and return to normal function including work duties, housework, hobbies, exercise (swimming/tennis), driving, sleeping, and carrying objects with minimal pain/difficulty.     STG: (to be met in 12 treatments)  ? Pain: 0-2/10 right shoulder and eliminate tingling in right UE to return to normal function including work duties, housework, hobbies, exercise (swimming/tennis), driving, sleeping, and carrying objects   ? ROM: cervical to WNLs to return to normal function including work duties, housework, hobbies, exercise (swimming/tennis), driving, sleeping, and carrying objects.    ? Strength: right UE to 5/5 to return to normal function including work duties, housework, hobbies, exercise (swimming/tennis), driving, sleeping, and carrying objects.    ? Function: improve UEFI to 78/80  Patient to be independent with home exercise program as demonstrated by performance with correct form without cues.     LTG: (to be met in 24

## 2024-12-03 ENCOUNTER — HOSPITAL ENCOUNTER (OUTPATIENT)
Age: 64
Setting detail: THERAPIES SERIES
Discharge: HOME | End: 2024-12-03
Attending: STUDENT IN AN ORGANIZED HEALTH CARE EDUCATION/TRAINING PROGRAM
Payer: COMMERCIAL

## 2024-12-03 PROCEDURE — 97012 MECHANICAL TRACTION THERAPY: CPT

## 2024-12-03 PROCEDURE — 97140 MANUAL THERAPY 1/> REGIONS: CPT

## 2024-12-03 PROCEDURE — 97110 THERAPEUTIC EXERCISES: CPT

## 2024-12-03 NOTE — FLOWSHEET NOTE
treatments)  Patient will return to normal function including work duties, housework, hobbies, exercise (swimming/tennis), driving, sleeping, and carrying objects.          Patient goals: \"Get to the source of the problem and alleviate symptoms\"       Pt. Education:  [x] Plans/Goals, Risks/Benefits discussed  [x] Home exercise program  Method of Education: [x] Verbal  [x] Demo  [] Written  Comprehension of Education:  [] Verbalizes understanding.  [] Demonstrates understanding.  [x] Needs Review.  [] Demonstrates/verbalizes understanding of HEP/Ed previously given.  *see flowsheet for exercises for home     Plan: [x] Continue per plan of care.   [] Other:      Treatment Charges: Mins Units Time in/out   [x]  Modalities: traction 15 1    [x]  Ther Exercise 19 1    [x]  Manual Therapy 8 1    []  Ther Activities      []  Aquatics      []  Neuromuscular      [] Vasocompression      [] Gait Training      [] Dry needling        [] 1 or 2 muscles        [] 3 or more muscles      []  Other      Total Treatment time 42 3      Time In:   4:31 pm            Time Out: 5:17 pm    Electronically signed by:  Edwar Banegas, PT

## 2024-12-05 ENCOUNTER — HOSPITAL ENCOUNTER (OUTPATIENT)
Age: 64
Setting detail: THERAPIES SERIES
Discharge: HOME | End: 2024-12-05
Attending: STUDENT IN AN ORGANIZED HEALTH CARE EDUCATION/TRAINING PROGRAM
Payer: COMMERCIAL

## 2024-12-05 PROCEDURE — 97140 MANUAL THERAPY 1/> REGIONS: CPT

## 2024-12-05 PROCEDURE — 97110 THERAPEUTIC EXERCISES: CPT

## 2024-12-05 PROCEDURE — 97012 MECHANICAL TRACTION THERAPY: CPT

## 2024-12-05 NOTE — FLOWSHEET NOTE
[x] [facility]  Outpatient Rehabilitation & Therapy  [address] [place], [place] [address]    Physical Therapy Daily Treatment Note      Date:  2024  Patient Name:  Fabiana Ignacio    :  1960  MRN: [id number]  Physician: Robert Richards MD                              Insurance: [facility] BMN no auth  Medical Diagnosis: Trap muscle spasm; paresthesia and pain of right UE                        Rehab Codes: M25.511, M79.621, R20.2  Onset Date: 10/30/21             Next 's appt.: Not scheduled  Visit# / total visits:   Cancels/No Shows: 1/0    Subjective:    Pain:  [x] Yes  [] No Location: right trap/scap and right arm     Pain Rating: (0-10 scale) 3/10  Pain altered Tx:  [] No  [x] Yes  Action: kept pain minimal  Comments:  Having more tingling down right UE- 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  Past Medical History:   Diagnosis Date    Asthma     Basal cell adenocarcinoma     Constipation     Depression     Incontinence     Prolonged emergence from general anesthesia     Sinusitis     Urinary frequency      Past Surgical History:   Procedure Laterality Date    BASAL CELL CARCINOMA EXCISION       SECTION      CYSTOSCOPY N/A 2024    CYSTOSCOPY INJECTION BOTOX 100 UNITS performed by Arun Aden MD at [place] [facility] OR    SINUS SURGERY      x3       Objective:  Modalities: mechanical traction  Precautions [x] No  [] Yes:  Exercises:  Exercise Reps/ Time Weight/ Level Comments   Seated chin tucks man/self 10x self   Denied pain with this   Scap retractions 15x    *caused some tingling Rt UE-hold for home   U.trap stretch (for right trap) 3x15\" anamaria       Towel assist cerv rotation/ext  10x ea.    Cracking in neck    sink stretch  15 x4    added    Supine man chin tucks 10x NP    No incr UE sx's   Supine self chin tucks 8x NP   *increased pain    Supine man traction with self chin tucks  5x NP       Manual right u.trap stretch supine

## 2024-12-11 ENCOUNTER — HOSPITAL ENCOUNTER (OUTPATIENT)
Age: 64
Setting detail: THERAPIES SERIES
Discharge: HOME | End: 2024-12-11
Attending: STUDENT IN AN ORGANIZED HEALTH CARE EDUCATION/TRAINING PROGRAM
Payer: COMMERCIAL

## 2024-12-11 NOTE — FLOWSHEET NOTE
[] [facility]  Outpatient Rehabilitation & Therapy  [address]   [place], [place] [address]       Physical Therapy Cancel/No Show note    Date: 2024  Patient: Fabiana Ignacio  : 1960  MRN: [id number]      Cancels/No Shows to date:     For today's appointment patient:    [x]  Cancelled    [] Rescheduled appointment    [] No-show     Reason given by patient:    []  Patient ill    []  Conflicting appointment    [] No transportation      [x] Conflict with work    [] No reason given    [] Weather related    [] COVID-19    [] Other:      Comments:  Has meeting scheduled after work tonight- would like a call back to reschedule appt      [] Next appointment was confirmed    Electronically signed by: Karuna Muro PTA

## 2024-12-13 DIAGNOSIS — M62.838 TRAPEZIUS MUSCLE SPASM: ICD-10-CM

## 2024-12-13 RX ORDER — CYCLOBENZAPRINE HCL 5 MG
TABLET ORAL
Qty: 20 TABLET | Refills: 0 | Status: SHIPPED | OUTPATIENT
Start: 2024-12-13

## 2024-12-13 NOTE — TELEPHONE ENCOUNTER
Fabiana HICKS Self is calling to request a refill on the following medication(s):    Medication Request:  Requested Prescriptions     Pending Prescriptions Disp Refills    cyclobenzaprine (FLEXERIL) 5 MG tablet [Pharmacy Med Name: CYCLOBENZAPRINE 5MG TABLETS] 20 tablet 0     Sig: TAKE 1 TABLET BY MOUTH TWICE DAILY AS NEEDED FOR MUSCLE SPASMS       Last Visit Date (If Applicable):  10/17/2024    Next Visit Date:    Visit date not found

## 2024-12-24 ENCOUNTER — HOSPITAL ENCOUNTER (OUTPATIENT)
Age: 64
Setting detail: THERAPIES SERIES
Discharge: HOME | End: 2024-12-24
Attending: STUDENT IN AN ORGANIZED HEALTH CARE EDUCATION/TRAINING PROGRAM
Payer: COMMERCIAL

## 2024-12-24 PROCEDURE — 97140 MANUAL THERAPY 1/> REGIONS: CPT

## 2024-12-24 PROCEDURE — 97012 MECHANICAL TRACTION THERAPY: CPT

## 2024-12-24 PROCEDURE — 97110 THERAPEUTIC EXERCISES: CPT

## 2024-12-24 NOTE — FLOWSHEET NOTE
[x] [facility]  Outpatient Rehabilitation & Therapy  [address] [place], [place] [address]    Physical Therapy Daily Treatment Note      Date:  2024  Patient Name:  Fabiana Ignacio    :  1960  MRN: [id number]  Physician: Robert Richards MD                              Insurance: [facility] BMN no auth  Medical Diagnosis: Trap muscle spasm; paresthesia and pain of right UE                        Rehab Codes: M25.511, M79.621, R20.2  Onset Date: 10/30/21             Next 's appt.: Not scheduled  Visit# / total visits:   Cancels/No Shows: 1/0    Subjective:    Pain:  [x] Yes  [] No Location: right trap/scap and right arm     Pain Rating: (0-10 scale) 5/10  Pain altered Tx:  [] No  [x] Yes  Action: kept pain minimal  Comments:  Denies tingling this morning but has had it down her right arm at times.     Past Medical History:   Diagnosis Date    Asthma     Basal cell adenocarcinoma     Constipation     Depression     Incontinence     Prolonged emergence from general anesthesia     Sinusitis     Urinary frequency      Past Surgical History:   Procedure Laterality Date    BASAL CELL CARCINOMA EXCISION       SECTION      CYSTOSCOPY N/A 2024    CYSTOSCOPY INJECTION BOTOX 100 UNITS performed by Arun Aden MD at [facility] OR    SINUS SURGERY      x3       Objective:  Modalities: mechanical traction  Precautions [x] No  [] Yes:  Exercises:  Exercise Reps/ Time Weight/ Level Comments   Seated chin tucks man/self 10x self   Denied pain with this   Scap retractions 15x    *caused some tingling Rt UE-hold for home   U.trap stretch (for right trap) 3x15\" anamaria       Towel assist cerv rotation/ext  10x ea.    Cracking in neck    sink stretch  15 x4    added    Supine man chin tucks 10x NP    No incr UE sx's   Supine self chin tucks 8x NP   *increased pain    Supine man traction with self chin tucks  5x NP       Manual right u.trap stretch supine with trigger

## 2024-12-26 ENCOUNTER — HOSPITAL ENCOUNTER (OUTPATIENT)
Age: 64
Setting detail: THERAPIES SERIES
Discharge: HOME | End: 2024-12-26
Attending: STUDENT IN AN ORGANIZED HEALTH CARE EDUCATION/TRAINING PROGRAM
Payer: COMMERCIAL

## 2024-12-26 PROCEDURE — 97140 MANUAL THERAPY 1/> REGIONS: CPT

## 2024-12-26 PROCEDURE — 97110 THERAPEUTIC EXERCISES: CPT

## 2024-12-26 PROCEDURE — 97012 MECHANICAL TRACTION THERAPY: CPT

## 2024-12-26 NOTE — FLOWSHEET NOTE
*increased pain    Supine man traction with self chin tucks  5x NP       Manual right u.trap stretch supine with trigger point release 2'    Scap mobs completed             Corner stretch  3x15\"    added 11/6             Massage gun right trap/scap  6'                           Mech cerv traction with heat  15'  29 lbs 40\"on/10\" off  added 11/6   Other:    Specific Instructions for next treatment:  Monitor response to today's session and home program compliance.  Advance program to patient tolerance.      Assessment: [x] Progressing toward goals. 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 She has a few trigger point areas along UT/ medial scap border (mostly on right side, but present on both sides) noticed during STM but seems to be improving as well. No issues with ex's.   Pain free after treatment.        [] No change.        [] Other:    [x] Patient would continue to benefit from skilled physical therapy services in order to: decrease pain/tingling, improve ROM/strength and return to normal function including work duties, housework, hobbies, exercise (swimming/tennis), driving, sleeping, and carrying objects with minimal pain/difficulty.     STG: (to be met in 12 treatments)  ? Pain: 0-2/10 right shoulder and eliminate tingling in right UE to return to normal function including work duties, housework, hobbies, exercise (swimming/tennis), driving, sleeping, and carrying objects   ? ROM: cervical to WNLs to return to normal function including work duties, housework, hobbies, exercise (swimming/tennis), driving, sleeping, and carrying objects.    ? Strength: right UE to 5/5 to return to normal function including work duties, housework, hobbies, exercise (swimming/tennis), driving, sleeping, and carrying objects.    ?

## 2024-12-31 ENCOUNTER — HOSPITAL ENCOUNTER (OUTPATIENT)
Age: 64
Setting detail: THERAPIES SERIES
Discharge: HOME | End: 2024-12-31
Attending: STUDENT IN AN ORGANIZED HEALTH CARE EDUCATION/TRAINING PROGRAM
Payer: COMMERCIAL

## 2024-12-31 PROCEDURE — 97012 MECHANICAL TRACTION THERAPY: CPT

## 2024-12-31 PROCEDURE — 97140 MANUAL THERAPY 1/> REGIONS: CPT

## 2024-12-31 PROCEDURE — 97110 THERAPEUTIC EXERCISES: CPT

## 2024-12-31 NOTE — FLOWSHEET NOTE
[x] [facility]  Outpatient Rehabilitation & Therapy  [address] [place], [place] [address]    Physical Therapy Daily Treatment Note      Date:  2024  Patient Name:  Fabiana Ignacio    :  1960  MRN: [id number]  Physician: Robert Richards MD                              Insurance: [facility] BMN no auth  Medical Diagnosis: Trap muscle spasm; paresthesia and pain of right UE                        Rehab Codes: M25.511, M79.621, R20.2  Onset Date: 10/30/21             Next 's appt.: Not scheduled  Visit# / total visits:   Cancels/No Shows: 1/0    Subjective:    Pain:  [x] Yes  [] No Location: right trap/scap and right arm     Pain Rating: (0-10 scale) 5/10  Pain altered Tx:  [] No  [x] Yes  Action: kept pain minimal  Comments:  Pt stated she is a little more sore today and has had some tingling down her right arm today. Is still using heat at home which has helped.    Past Medical History:   Diagnosis Date    Asthma     Basal cell adenocarcinoma     Constipation     Depression     Incontinence     Prolonged emergence from general anesthesia     Sinusitis     Urinary frequency      Past Surgical History:   Procedure Laterality Date    BASAL CELL CARCINOMA EXCISION       SECTION      CYSTOSCOPY N/A 2024    CYSTOSCOPY INJECTION BOTOX 100 UNITS performed by Arun Aden MD at [facility] OR    SINUS SURGERY      x3       Objective:  Modalities: mechanical traction  Precautions [x] No  [] Yes:  Exercises:  Exercise Reps/ Time Weight/ Level Comments   Seated chin tucks man/self 10x self    Increased tingling in right arm   Scap retractions 15x    *caused some tingling Rt UE-hold for home   U.trap stretch (for right trap) 3x15\" anamaria       Towel assist cerv rotation/ext  10x ea.    Cracking in neck    sink stretch  15 x4    added    Supine man chin tucks 10x NP    No incr UE sx's   Supine self chin tucks 8x NP   *increased pain    Supine man traction

## 2025-01-07 ENCOUNTER — HOSPITAL ENCOUNTER (OUTPATIENT)
Age: 65
Setting detail: THERAPIES SERIES
Discharge: HOME | End: 2025-01-07
Attending: STUDENT IN AN ORGANIZED HEALTH CARE EDUCATION/TRAINING PROGRAM
Payer: COMMERCIAL

## 2025-01-07 PROCEDURE — 97110 THERAPEUTIC EXERCISES: CPT

## 2025-01-07 PROCEDURE — 97140 MANUAL THERAPY 1/> REGIONS: CPT

## 2025-01-07 PROCEDURE — 97012 MECHANICAL TRACTION THERAPY: CPT

## 2025-01-07 NOTE — FLOWSHEET NOTE
5x        Manual right u.trap stretch supine with trigger point release 2'    Scap mobs completed             Prone cervical CPA glides 2' Gr I/II Added 1/7   Corner stretch  3x15\"    added 11/6             Massage gun right trap/scap  6'        STM right UT in sitting  6'                 Mech cerv traction with heat  15'  29 lbs 40\"on/10\" off  added 11/6   Other:    Specific Instructions for next treatment:  Monitor response to today's session and home program compliance.  Advance program to patient tolerance.      Assessment: [x] Progressing toward goals. Did well with all exercises/manual techniques.  Added prone cervical CPA glides-went low intensity for first time, but tolerated well.  No tinging in hand after exercises/manual.  Good response to traction.          [] No change.        [] Other:    [x] Patient would continue to benefit from skilled physical therapy services in order to: decrease pain/tingling, improve ROM/strength and return to normal function including work duties, housework, hobbies, exercise (swimming/tennis), driving, sleeping, and carrying objects with minimal pain/difficulty.     STG: (to be met in 12 treatments)  ? Pain: 0-2/10 right shoulder and eliminate tingling in right UE to return to normal function including work duties, housework, hobbies, exercise (swimming/tennis), driving, sleeping, and carrying objects   ? ROM: cervical to WNLs to return to normal function including work duties, housework, hobbies, exercise (swimming/tennis), driving, sleeping, and carrying objects.    ? Strength: right UE to 5/5 to return to normal function including work duties, housework, hobbies, exercise (swimming/tennis), driving, sleeping, and carrying objects.    ? Function: improve UEFI to 78/80  Patient to be independent with home exercise program as demonstrated by performance with correct form without cues.     LTG: (to be met in 24 treatments)  Patient will return to normal function including work

## 2025-01-09 ENCOUNTER — HOSPITAL ENCOUNTER (OUTPATIENT)
Age: 65
Setting detail: THERAPIES SERIES
Discharge: HOME | End: 2025-01-09
Attending: STUDENT IN AN ORGANIZED HEALTH CARE EDUCATION/TRAINING PROGRAM
Payer: COMMERCIAL

## 2025-01-09 NOTE — FLOWSHEET NOTE
[] [facility]  Outpatient Rehabilitation & Therapy  [address]   [place], [place] [address]       Physical Therapy Cancel/No Show note    Date: 2025  Patient: Fabiana Ignacio  : 1960  MRN: [id number]      Cancels/No Shows to date:     For today's appointment patient:    []  Cancelled    [] Rescheduled appointment    [x] No-show     Reason given by patient:    []  Patient ill    []  Conflicting appointment    [] No transportation      [] Conflict with work    [] No reason given    [] Weather related    [] COVID-19    [] Other:      Comments:        [x] Next appointment was confirmed- called and left VM confirming next appt    Electronically signed by: Karuna Brown PTA

## 2025-01-13 RX ORDER — PANTOPRAZOLE SODIUM 20 MG/1
20 TABLET, DELAYED RELEASE ORAL
Qty: 90 TABLET | Refills: 1 | Status: SHIPPED | OUTPATIENT
Start: 2025-01-13 | End: 2025-01-16 | Stop reason: SDUPTHER

## 2025-01-13 NOTE — TELEPHONE ENCOUNTER
Fabiana HICKS Self is calling to request a refill on the following medication(s):    Medication Request:  Requested Prescriptions     Pending Prescriptions Disp Refills    pantoprazole (PROTONIX) 20 MG tablet [Pharmacy Med Name: PANTOPRAZOLE 20MG TABLETS] 90 tablet 1     Sig: TAKE 1 TABLET BY MOUTH EVERY MORNING BEFORE BREAKFAST       Last Visit Date (If Applicable):  8/1/2024    Next Visit Date:    1/16/2025

## 2025-01-14 ENCOUNTER — HOSPITAL ENCOUNTER (OUTPATIENT)
Age: 65
Setting detail: THERAPIES SERIES
Discharge: HOME | End: 2025-01-14
Attending: STUDENT IN AN ORGANIZED HEALTH CARE EDUCATION/TRAINING PROGRAM
Payer: COMMERCIAL

## 2025-01-14 PROCEDURE — 97110 THERAPEUTIC EXERCISES: CPT

## 2025-01-14 PROCEDURE — 97140 MANUAL THERAPY 1/> REGIONS: CPT

## 2025-01-14 PROCEDURE — 97012 MECHANICAL TRACTION THERAPY: CPT

## 2025-01-14 NOTE — FLOWSHEET NOTE
[x] [facility]  Outpatient Rehabilitation & Therapy  [address] [place], [place] [address]    Physical Therapy Daily Treatment Note      Date:  2025  Patient Name:  Fabiana Ignacio    :  1960  MRN: [id number]  Physician: Robert Richards MD                              Insurance: [facility] BMN no auth  Medical Diagnosis: Trap muscle spasm; paresthesia and pain of right UE                        Rehab Codes: M25.511, M79.621, R20.2  Onset Date: 10/30/21             Next 's appt.: Not scheduled  Visit# / total visits:   Cancels/No Shows: 1/0    Subjective:    Pain:  [x] Yes  [] No Location: right trap/scap    Pain Rating: (0-10 scale) 4/10  Pain altered Tx:  [] No  [x] Yes  Action: kept pain minimal  Comments:  Pt stated she went back to some swimming but had increased arm symptoms.  Continues to get some tingling into her hand.      Past Medical History:   Diagnosis Date    Asthma     Basal cell adenocarcinoma     Constipation     Depression     Incontinence     Prolonged emergence from general anesthesia     Sinusitis     Urinary frequency      Past Surgical History:   Procedure Laterality Date    BASAL CELL CARCINOMA EXCISION       SECTION      CYSTOSCOPY N/A 2024    CYSTOSCOPY INJECTION BOTOX 100 UNITS performed by Arun Aden MD at [facility] OR    SINUS SURGERY      x3       Objective:  Modalities: mechanical traction  Precautions [x] No  [] Yes:  Exercises:  Exercise Reps/ Time Weight/ Level Comments   Seated chin tucks man/self 10x self (stopped today due to right arm tingling)    Increased tingling in right arm   Scap retractions 15x    *caused some tingling Rt UE-hold for home   U.trap stretch (for right trap) 3x15\" anamaria       Towel assist cerv rotation/ext  10x ea.    Cracking in neck    sink stretch  15 x4    added    Supine man chin tucks 10x     No incr UE sx's   Supine self chin tucks 8x NP   *increased pain    Supine man

## 2025-01-16 ENCOUNTER — OFFICE VISIT (OUTPATIENT)
Age: 65
End: 2025-01-16
Payer: COMMERCIAL

## 2025-01-16 VITALS
WEIGHT: 204.6 LBS | SYSTOLIC BLOOD PRESSURE: 134 MMHG | DIASTOLIC BLOOD PRESSURE: 78 MMHG | HEIGHT: 68 IN | BODY MASS INDEX: 31.01 KG/M2 | OXYGEN SATURATION: 99 % | HEART RATE: 77 BPM

## 2025-01-16 DIAGNOSIS — M62.838 TRAPEZIUS MUSCLE SPASM: ICD-10-CM

## 2025-01-16 DIAGNOSIS — M50.121 CERVICAL DISC DISORDER AT C4-C5 LEVEL WITH RADICULOPATHY: Primary | ICD-10-CM

## 2025-01-16 PROBLEM — J34.3 HYPERTROPHY OF INFERIOR NASAL TURBINATE: Status: ACTIVE | Noted: 2017-03-01

## 2025-01-16 PROBLEM — B37.31 CANDIDIASIS OF VAGINA: Status: ACTIVE | Noted: 2023-12-30

## 2025-01-16 PROBLEM — R09.81 NASAL CONGESTION: Status: ACTIVE | Noted: 2017-01-25

## 2025-01-16 PROBLEM — N89.8 VAGINAL ODOR: Status: ACTIVE | Noted: 2023-12-30

## 2025-01-16 PROBLEM — R94.39 CARDIOVASCULAR STRESS TEST ABNORMAL: Status: ACTIVE | Noted: 2020-03-10

## 2025-01-16 PROBLEM — R14.0 ABDOMINAL BLOATING: Status: ACTIVE | Noted: 2023-12-30

## 2025-01-16 PROBLEM — N95.1 MENOPAUSAL SYMPTOMS: Status: RESOLVED | Noted: 2023-03-01 | Resolved: 2025-01-16

## 2025-01-16 PROBLEM — J34.2 DNS (DEVIATED NASAL SEPTUM): Status: ACTIVE | Noted: 2017-03-01

## 2025-01-16 PROBLEM — K21.9 GASTROESOPHAGEAL REFLUX DISEASE: Status: ACTIVE | Noted: 2023-12-30

## 2025-01-16 PROBLEM — J31.0 CHRONIC RHINITIS: Status: ACTIVE | Noted: 2017-01-25

## 2025-01-16 PROBLEM — K59.09 CHRONIC CONSTIPATION: Status: ACTIVE | Noted: 2023-12-30

## 2025-01-16 PROBLEM — B02.29 POSTHERPETIC NEURALGIA: Status: ACTIVE | Noted: 2023-12-30

## 2025-01-16 PROBLEM — B34.9 VIRAL INFECTION: Status: ACTIVE | Noted: 2023-12-30

## 2025-01-16 PROBLEM — B97.7 HUMAN PAPILLOMA VIRUS (HPV) INFECTION: Status: ACTIVE | Noted: 2023-12-30

## 2025-01-16 PROBLEM — R82.90 ABNORMAL FINDING IN URINE: Status: ACTIVE | Noted: 2023-12-30

## 2025-01-16 PROBLEM — I10 ESSENTIAL HYPERTENSION: Status: ACTIVE | Noted: 2020-01-21

## 2025-01-16 PROBLEM — M79.10 MUSCLE PAIN: Status: ACTIVE | Noted: 2023-12-30

## 2025-01-16 PROBLEM — N76.3 SUBACUTE ON CHRONIC VULVITIS: Status: ACTIVE | Noted: 2023-12-30

## 2025-01-16 PROBLEM — N94.10 DYSPAREUNIA IN FEMALE: Status: ACTIVE | Noted: 2023-03-01

## 2025-01-16 PROBLEM — S16.1XXA STRAIN OF NECK MUSCLE: Status: ACTIVE | Noted: 2023-12-30

## 2025-01-16 PROBLEM — J98.8 CONGESTION OF RESPIRATORY TRACT: Status: ACTIVE | Noted: 2023-12-30

## 2025-01-16 PROBLEM — R07.9 CHEST PAIN: Status: ACTIVE | Noted: 2020-01-21

## 2025-01-16 PROBLEM — R00.2 PALPITATIONS: Status: ACTIVE | Noted: 2020-01-21

## 2025-01-16 PROBLEM — N92.6 IRREGULAR MENSTRUAL CYCLE: Status: ACTIVE | Noted: 2023-12-30

## 2025-01-16 PROBLEM — F43.9 STRESS: Status: ACTIVE | Noted: 2023-12-30

## 2025-01-16 PROBLEM — N89.8 VAGINAL DRYNESS: Status: ACTIVE | Noted: 2023-03-01

## 2025-01-16 PROBLEM — C80.1 MALIGNANT NEOPLASTIC DISEASE (HCC): Status: ACTIVE | Noted: 2023-12-30

## 2025-01-16 PROBLEM — S46.819A STRAIN OF TRAPEZIUS MUSCLE: Status: ACTIVE | Noted: 2023-12-30

## 2025-01-16 PROBLEM — J32.0 CHRONIC MAXILLARY SINUSITIS: Status: ACTIVE | Noted: 2017-01-25

## 2025-01-16 PROBLEM — N31.8 OTHER NEUROMUSCULAR DYSFUNCTION OF BLADDER: Status: ACTIVE | Noted: 2023-12-30

## 2025-01-16 PROCEDURE — 3075F SYST BP GE 130 - 139MM HG: CPT | Performed by: FAMILY MEDICINE

## 2025-01-16 PROCEDURE — G8427 DOCREV CUR MEDS BY ELIG CLIN: HCPCS | Performed by: FAMILY MEDICINE

## 2025-01-16 PROCEDURE — 3078F DIAST BP <80 MM HG: CPT | Performed by: FAMILY MEDICINE

## 2025-01-16 PROCEDURE — 3017F COLORECTAL CA SCREEN DOC REV: CPT | Performed by: FAMILY MEDICINE

## 2025-01-16 PROCEDURE — G8417 CALC BMI ABV UP PARAM F/U: HCPCS | Performed by: FAMILY MEDICINE

## 2025-01-16 PROCEDURE — 99214 OFFICE O/P EST MOD 30 MIN: CPT | Performed by: FAMILY MEDICINE

## 2025-01-16 PROCEDURE — 1036F TOBACCO NON-USER: CPT | Performed by: FAMILY MEDICINE

## 2025-01-16 RX ORDER — SODIUM, POTASSIUM,MAG SULFATES 17.5-3.13G
SOLUTION, RECONSTITUTED, ORAL ORAL
COMMUNITY
Start: 2025-01-14

## 2025-01-16 RX ORDER — CYCLOBENZAPRINE HCL 5 MG
5 TABLET ORAL 3 TIMES DAILY PRN
Qty: 60 TABLET | Refills: 1 | Status: SHIPPED | OUTPATIENT
Start: 2025-01-16

## 2025-01-16 RX ORDER — PANTOPRAZOLE SODIUM 20 MG/1
20 TABLET, DELAYED RELEASE ORAL
Qty: 90 TABLET | Refills: 1 | Status: SHIPPED | OUTPATIENT
Start: 2025-01-16

## 2025-01-16 RX ORDER — ACETAMINOPHEN AND CODEINE PHOSPHATE 300; 30 MG/1; MG/1
1 TABLET ORAL EVERY 6 HOURS PRN
Qty: 60 TABLET | Refills: 0 | Status: SHIPPED | OUTPATIENT
Start: 2025-01-16 | End: 2025-01-31

## 2025-01-16 RX ORDER — MIRABEGRON 50 MG/1
50 TABLET, FILM COATED, EXTENDED RELEASE ORAL DAILY
COMMUNITY
Start: 2024-12-15

## 2025-01-16 SDOH — ECONOMIC STABILITY: FOOD INSECURITY: WITHIN THE PAST 12 MONTHS, THE FOOD YOU BOUGHT JUST DIDN'T LAST AND YOU DIDN'T HAVE MONEY TO GET MORE.: NEVER TRUE

## 2025-01-16 SDOH — ECONOMIC STABILITY: FOOD INSECURITY: WITHIN THE PAST 12 MONTHS, YOU WORRIED THAT YOUR FOOD WOULD RUN OUT BEFORE YOU GOT MONEY TO BUY MORE.: NEVER TRUE

## 2025-01-16 ASSESSMENT — PATIENT HEALTH QUESTIONNAIRE - PHQ9
SUM OF ALL RESPONSES TO PHQ QUESTIONS 1-9: 0
1. LITTLE INTEREST OR PLEASURE IN DOING THINGS: NOT AT ALL
SUM OF ALL RESPONSES TO PHQ9 QUESTIONS 1 & 2: 0
2. FEELING DOWN, DEPRESSED OR HOPELESS: NOT AT ALL
SUM OF ALL RESPONSES TO PHQ QUESTIONS 1-9: 0

## 2025-01-16 NOTE — PATIENT INSTRUCTIONS
Fabiana    Thank you for choosing [facility].  We know you have options when it comes to your healthcare; we appreciate that you chose us. Our goal is to provide exceptional  service and world class care to every patient.  You will be receiving a survey via email or text message asking for your feedback.  Please take a few minutes to share your thoughts about your recent visit. Your comments help us understand what we do well and ways we can improve.  Thank you in advance for your valuable feedback.      Dr. Ino ESCALANTE MA

## 2025-01-16 NOTE — PROGRESS NOTES
[facility]     Date of Visit:  2025  Patient Name: Fabiana Ignacio   Patient :  1960     CHIEF COMPLAINT/HPI:     Fabiana Ignacio is a 64 y.o. female who presents today for an general visit to be evaluated for the following condition(s):  Chief Complaint   Patient presents with    6 Month Follow-Up    Neck Pain     Had xrays done. Still doing PT    Shoulder Pain    Health Maintenance     Has a colonoscopy scheduled for     Congestion     Patient has been going to physical therapy since October.  She continues to have pain right trapezius with radiation to the right deltoid and right little and ring finger.  Luckily she can sleep but she does have to use a heating pad a lot.  She does not notice any strength abnormalities in the right hand.  She cannot take anti-inflammatory medications because of allergies she does take Flexeril and Tylenol imaging studies of the cervical spine show degenerative disc disease at at least 2 levels.      Details    Reading Physician Reading Date Result Priority   Nicolás oJnes DO  [phone] 2025      Narrative & Impression  EXAMINATION:  3 XRAY VIEWS OF THE CERVICAL SPINE     1/3/2025 4:17 pm     COMPARISON:  None.     HISTORY:  ORDERING SYSTEM PROVIDED HISTORY: Trapezius muscle spasm  TECHNOLOGIST PROVIDED HISTORY:  Reason for Exam: Pt c/o neck pain that radiates into right shoulder, down  right arm with numbness and tingling. Worsening over time.     FINDINGS:  Straightening of cervical spine.  Mild retrolisthesis C4 on C5 and C5 on C6.  No acute osseous abnormality.  The odontoid is intact as visualized.  Moderate degenerative change most significant C4-5 C5-6 with loss of disc  height and endplate spondylosis.  Prevertebral soft tissues are unremarkable.     IMPRESSION:  No acute findings.     Multilevel degenerative change.                     REVIEW OF SYSTEM      Review of Systems  No fever no sweats no chills no nausea no vomiting or diarrhea no

## 2025-02-01 ENCOUNTER — HOSPITAL ENCOUNTER (OUTPATIENT)
Dept: MRI IMAGING | Age: 65
End: 2025-02-01
Attending: FAMILY MEDICINE
Payer: COMMERCIAL

## 2025-02-01 DIAGNOSIS — M50.121 CERVICAL DISC DISORDER AT C4-C5 LEVEL WITH RADICULOPATHY: ICD-10-CM

## 2025-02-01 PROCEDURE — 72141 MRI NECK SPINE W/O DYE: CPT

## 2025-02-03 NOTE — RESULT ENCOUNTER NOTE
Please call patient MRI showed 2 levels of fairly severe pinched nerves in the neck mostly on the right but also on the left.  Neck step is to see pain management as discussed last visit if this is not successful then neurosurgeon will be consulted

## 2025-02-27 ENCOUNTER — HOSPITAL ENCOUNTER (OUTPATIENT)
Age: 65
Setting detail: SPECIMEN
Discharge: HOME | End: 2025-02-27

## 2025-02-27 ENCOUNTER — OFFICE VISIT (OUTPATIENT)
Age: 65
End: 2025-02-27
Payer: COMMERCIAL

## 2025-02-27 VITALS
SYSTOLIC BLOOD PRESSURE: 142 MMHG | HEART RATE: 87 BPM | BODY MASS INDEX: 31.33 KG/M2 | TEMPERATURE: 97.3 F | DIASTOLIC BLOOD PRESSURE: 78 MMHG | OXYGEN SATURATION: 96 % | WEIGHT: 203 LBS

## 2025-02-27 DIAGNOSIS — M50.121 CERVICAL DISC DISORDER AT C4-C5 LEVEL WITH RADICULOPATHY: Primary | ICD-10-CM

## 2025-02-27 DIAGNOSIS — Z12.11 COLON CANCER SCREENING: ICD-10-CM

## 2025-02-27 DIAGNOSIS — M62.838 TRAPEZIUS MUSCLE SPASM: ICD-10-CM

## 2025-02-27 DIAGNOSIS — H65.02 NON-RECURRENT ACUTE SEROUS OTITIS MEDIA OF LEFT EAR: ICD-10-CM

## 2025-02-27 LAB
25(OH)D3 SERPL-MCNC: 24.8 NG/ML (ref 30–100)
T4 FREE SERPL-MCNC: 1.2 NG/DL (ref 0.9–1.7)
TSH SERPL DL<=0.05 MIU/L-ACNC: 1.06 UIU/ML (ref 0.27–4.2)
VIT B12 SERPL-MCNC: 368 PG/ML (ref 232–1245)

## 2025-02-27 PROCEDURE — 3077F SYST BP >= 140 MM HG: CPT | Performed by: FAMILY MEDICINE

## 2025-02-27 PROCEDURE — G8427 DOCREV CUR MEDS BY ELIG CLIN: HCPCS | Performed by: FAMILY MEDICINE

## 2025-02-27 PROCEDURE — 3017F COLORECTAL CA SCREEN DOC REV: CPT | Performed by: FAMILY MEDICINE

## 2025-02-27 PROCEDURE — G8417 CALC BMI ABV UP PARAM F/U: HCPCS | Performed by: FAMILY MEDICINE

## 2025-02-27 PROCEDURE — 1036F TOBACCO NON-USER: CPT | Performed by: FAMILY MEDICINE

## 2025-02-27 PROCEDURE — 99213 OFFICE O/P EST LOW 20 MIN: CPT | Performed by: FAMILY MEDICINE

## 2025-02-27 PROCEDURE — 3078F DIAST BP <80 MM HG: CPT | Performed by: FAMILY MEDICINE

## 2025-02-27 RX ORDER — AMOXICILLIN 500 MG/1
500 CAPSULE ORAL 3 TIMES DAILY
Qty: 30 CAPSULE | Refills: 0 | Status: SHIPPED | OUTPATIENT
Start: 2025-02-27 | End: 2025-03-09

## 2025-02-27 RX ORDER — CYCLOBENZAPRINE HCL 5 MG
5 TABLET ORAL 3 TIMES DAILY PRN
Qty: 60 TABLET | Refills: 1 | Status: SHIPPED | OUTPATIENT
Start: 2025-02-27

## 2025-02-27 RX ORDER — AZELASTINE 1 MG/ML
2 SPRAY, METERED NASAL 2 TIMES DAILY
Qty: 120 ML | Refills: 1 | Status: SHIPPED | OUTPATIENT
Start: 2025-02-27

## 2025-02-27 NOTE — PROGRESS NOTES
as needed for Muscle spasms     Dispense:  60 tablet     Refill:  1    amoxicillin (AMOXIL) 500 MG capsule     Sig: Take 1 capsule by mouth 3 times daily for 10 days     Dispense:  30 capsule     Refill:  0    azelastine (ASTELIN) 0.1 % nasal spray     Si sprays by Nasal route 2 times daily Use in each nostril as directed     Dispense:  120 mL     Refill:  1       Return in about 3 months (around 2025).  Patient will see pain management next month amoxicillin which she can take 3 times a day for 10 days for serous otitis add Astelin nasal spray refill Flexeril prescription also Cologuard ordered she had colonoscopy about 10 to 12 years ago and was negative    COMMUNICATION:       Electronically signed by Norman Mckinnon MD on 2025 at 2:04 PM    Portion of this note were dictated using Dragon speech recognition software. It has been reviewed for accuracy, but may contain grammatical and clerical errors.

## 2025-03-10 ENCOUNTER — RESULTS FOLLOW-UP (OUTPATIENT)
Age: 65
End: 2025-03-10

## 2025-03-10 LAB — NONINV COLON CA DNA+OCC BLD SCRN STL QL: NORMAL

## 2025-03-19 ENCOUNTER — TELEPHONE (OUTPATIENT)
Age: 65
End: 2025-03-19

## 2025-03-19 DIAGNOSIS — M50.121 CERVICAL DISC DISORDER AT C4-C5 LEVEL WITH RADICULOPATHY: Primary | ICD-10-CM

## 2025-03-19 RX ORDER — GABAPENTIN 100 MG/1
CAPSULE ORAL
Qty: 90 CAPSULE | Refills: 1 | Status: SHIPPED | OUTPATIENT
Start: 2025-03-19 | End: 2025-04-19

## 2025-03-19 NOTE — TELEPHONE ENCOUNTER
Patient states you have referred her to 2 different pain management doctors for her neck pain and 1 it took her 3 months to get in with and then they cancelled her appointment yesterday and the other one just called her last week so she is assuming it will take her a few months to get in she is wondering if you could just send in a prescription for a nerve blocker to get her through until she can see one of these doctors. She just wants to be able to go swimming.  Gautam Wolfe

## 2025-03-24 ENCOUNTER — TELEPHONE (OUTPATIENT)
Dept: PAIN MANAGEMENT | Age: 65
End: 2025-03-24

## 2025-03-24 NOTE — TELEPHONE ENCOUNTER
Writer called patient in regards to rescheduling consult flako with Dr. Gray. Patient did not answer, writer left .

## 2025-03-26 LAB — NONINV COLON CA DNA+OCC BLD SCRN STL QL: NORMAL

## 2025-04-02 ENCOUNTER — E-VISIT (OUTPATIENT)
Age: 65
End: 2025-04-02

## 2025-04-02 DIAGNOSIS — J01.10 ACUTE NON-RECURRENT FRONTAL SINUSITIS: Primary | ICD-10-CM

## 2025-04-02 RX ORDER — CLINDAMYCIN HYDROCHLORIDE 300 MG/1
300 CAPSULE ORAL 3 TIMES DAILY
Qty: 30 CAPSULE | Refills: 0 | Status: SHIPPED | OUTPATIENT
Start: 2025-04-02 | End: 2025-04-12

## 2025-04-02 RX ORDER — AZELASTINE 1 MG/ML
2 SPRAY, METERED NASAL 2 TIMES DAILY
Qty: 120 ML | Refills: 1 | Status: SHIPPED | OUTPATIENT
Start: 2025-04-02

## 2025-04-02 RX ORDER — BENZONATATE 100 MG/1
100-200 CAPSULE ORAL 3 TIMES DAILY PRN
Qty: 60 CAPSULE | Refills: 0 | Status: SHIPPED | OUTPATIENT
Start: 2025-04-02 | End: 2025-04-09

## 2025-04-02 NOTE — PROGRESS NOTES
E-visit documents reviewed.  Prescription for Cleocin nasal spray and cough tablets sent into her local pharmacy    Patient informed time to incurred during this encounter 7 min

## 2025-04-03 NOTE — PROGRESS NOTES
Left detailed voicemail message for patient yesterday late afternoon and again at this time to inform her that Dr. Mckinnon sent in some scripts for her.

## 2025-04-20 ENCOUNTER — E-VISIT (OUTPATIENT)
Age: 65
End: 2025-04-20
Payer: COMMERCIAL

## 2025-04-20 DIAGNOSIS — J01.00 ACUTE NON-RECURRENT MAXILLARY SINUSITIS: Primary | ICD-10-CM

## 2025-04-20 PROCEDURE — 99421 OL DIG E/M SVC 5-10 MIN: CPT | Performed by: FAMILY MEDICINE

## 2025-04-20 ASSESSMENT — LIFESTYLE VARIABLES
SMOKING_STATUS: NO, I'M A FORMER SMOKER
SMOKING_YEARS: 5
PACKS_PER_DAY: .5

## 2025-04-21 RX ORDER — AMOXICILLIN 500 MG/1
500 CAPSULE ORAL 3 TIMES DAILY
Qty: 30 CAPSULE | Refills: 0 | Status: SHIPPED | OUTPATIENT
Start: 2025-04-21 | End: 2025-05-01

## 2025-04-21 RX ORDER — BENZONATATE 200 MG/1
200 CAPSULE ORAL 3 TIMES DAILY PRN
Qty: 30 CAPSULE | Refills: 0 | Status: SHIPPED | OUTPATIENT
Start: 2025-04-21 | End: 2025-05-01

## 2025-04-21 NOTE — PROGRESS NOTES
Information provided by patient reviewed.  Chart reviewed.  She is allergic to Keflex but cannot take amoxicillin.  Prescription for amoxicillin cough tablets and nasal spray sent into her pharmacy    Time of visit 7-minute

## 2025-05-17 DIAGNOSIS — M50.121 CERVICAL DISC DISORDER AT C4-C5 LEVEL WITH RADICULOPATHY: ICD-10-CM

## 2025-05-19 RX ORDER — GABAPENTIN 100 MG/1
CAPSULE ORAL
Qty: 90 CAPSULE | Refills: 1 | Status: SHIPPED | OUTPATIENT
Start: 2025-05-19 | End: 2025-06-19

## 2025-05-19 NOTE — TELEPHONE ENCOUNTER
Fabiana HICKS Self is calling to request a refill on the following medication(s):    Medication Request:  Requested Prescriptions     Pending Prescriptions Disp Refills    gabapentin (NEURONTIN) 100 MG capsule [Pharmacy Med Name: GABAPENTIN 100 MG CAPSULE] 90 capsule 1     Sig: TAKE 1 CAPSULE BY MOUTH 3 TIMES A DAY FOR PINCHED NERVE PAIN       Last Visit Date (If Applicable):  4/20/2025    Next Visit Date:    Visit date not found

## 2025-07-01 RX ORDER — MIRABEGRON 50 MG/1
50 TABLET, FILM COATED, EXTENDED RELEASE ORAL DAILY
Qty: 30 TABLET | Refills: 1 | Status: SHIPPED | OUTPATIENT
Start: 2025-07-01

## 2025-07-16 RX ORDER — PANTOPRAZOLE SODIUM 20 MG/1
20 TABLET, DELAYED RELEASE ORAL
Qty: 90 TABLET | Refills: 1 | Status: SHIPPED | OUTPATIENT
Start: 2025-07-16

## 2025-07-16 NOTE — TELEPHONE ENCOUNTER
Fabiana HICKS Self is calling to request a refill on the following medication(s):    Medication Request:  Requested Prescriptions     Pending Prescriptions Disp Refills    pantoprazole (PROTONIX) 20 MG tablet [Pharmacy Med Name: PANTOPRAZOLE SOD DR 20 MG TAB] 90 tablet 1     Sig: TAKE ONE TABLET BY MOUTH EVERY MORNING BEFORE BREAKFAST       Last Visit Date (If Applicable):  4/20/2025    Next Visit Date:    7/23/2025

## 2025-07-20 DIAGNOSIS — M50.121 CERVICAL DISC DISORDER AT C4-C5 LEVEL WITH RADICULOPATHY: ICD-10-CM

## 2025-07-21 RX ORDER — GABAPENTIN 100 MG/1
CAPSULE ORAL
Qty: 90 CAPSULE | Refills: 1 | Status: SHIPPED | OUTPATIENT
Start: 2025-07-21 | End: 2025-09-21

## 2025-07-21 NOTE — TELEPHONE ENCOUNTER
Fabiana HICKS Self is calling to request a refill on the following medication(s):    Medication Request:  Requested Prescriptions     Pending Prescriptions Disp Refills    gabapentin (NEURONTIN) 100 MG capsule [Pharmacy Med Name: GABAPENTIN 100 MG CAPSULE] 90 capsule 1     Sig: TAKE 1 CAPSULE BY MOUTH 3 TIMES A DAY FOR PINCHED NERVE PAIN       Last Visit Date (If Applicable):  4/20/2025    Next Visit Date:    7/23/2025

## 2025-07-23 ENCOUNTER — OFFICE VISIT (OUTPATIENT)
Age: 65
End: 2025-07-23
Payer: COMMERCIAL

## 2025-07-23 VITALS
OXYGEN SATURATION: 98 % | HEART RATE: 81 BPM | HEIGHT: 68 IN | DIASTOLIC BLOOD PRESSURE: 82 MMHG | WEIGHT: 205 LBS | SYSTOLIC BLOOD PRESSURE: 138 MMHG | BODY MASS INDEX: 31.07 KG/M2

## 2025-07-23 DIAGNOSIS — R63.5 WEIGHT GAIN: ICD-10-CM

## 2025-07-23 DIAGNOSIS — Z12.31 ENCOUNTER FOR SCREENING MAMMOGRAM FOR MALIGNANT NEOPLASM OF BREAST: Primary | ICD-10-CM

## 2025-07-23 DIAGNOSIS — K58.1 IRRITABLE BOWEL SYNDROME WITH CONSTIPATION: ICD-10-CM

## 2025-07-23 PROCEDURE — 99213 OFFICE O/P EST LOW 20 MIN: CPT | Performed by: FAMILY MEDICINE

## 2025-07-23 PROCEDURE — 3075F SYST BP GE 130 - 139MM HG: CPT | Performed by: FAMILY MEDICINE

## 2025-07-23 PROCEDURE — 1036F TOBACCO NON-USER: CPT | Performed by: FAMILY MEDICINE

## 2025-07-23 PROCEDURE — G8417 CALC BMI ABV UP PARAM F/U: HCPCS | Performed by: FAMILY MEDICINE

## 2025-07-23 PROCEDURE — G8427 DOCREV CUR MEDS BY ELIG CLIN: HCPCS | Performed by: FAMILY MEDICINE

## 2025-07-23 PROCEDURE — 3079F DIAST BP 80-89 MM HG: CPT | Performed by: FAMILY MEDICINE

## 2025-07-23 PROCEDURE — 3017F COLORECTAL CA SCREEN DOC REV: CPT | Performed by: FAMILY MEDICINE

## 2025-07-23 RX ORDER — PHENTERMINE HYDROCHLORIDE 37.5 MG/1
37.5 TABLET ORAL
Qty: 30 TABLET | Refills: 0 | Status: SHIPPED | OUTPATIENT
Start: 2025-07-23 | End: 2025-08-22

## 2025-07-23 RX ORDER — BUPROPION HYDROCHLORIDE 150 MG/1
150 TABLET ORAL EVERY MORNING
COMMUNITY

## 2025-07-23 RX ORDER — ASCORBIC ACID 500 MG
500 TABLET ORAL DAILY
COMMUNITY

## 2025-07-23 RX ORDER — MULTIVIT-MIN/IRON/FOLIC ACID/K 18-600-40
2000 CAPSULE ORAL DAILY
COMMUNITY

## 2025-07-23 NOTE — PATIENT INSTRUCTIONS
Fabiana    Thank you for choosing [facility].  We know you have options when it comes to your healthcare; we appreciate that you chose us. Our goal is to provide exceptional  service and world class care to every patient.  You will be receiving a survey via email or text message asking for your feedback.  Please take a few minutes to share your thoughts about your recent visit. Your comments help us understand what we do well and ways we can improve.  Thank you in advance for your valuable feedback.      Dr. Ino ESCALANTE MA

## 2025-07-23 NOTE — PROGRESS NOTES
[facility]     Date of Visit:  2025  Patient Name: Fabiana Ignacio   Patient :  1960     CHIEF COMPLAINT/HPI:     Fabiana Ignacio is a 64 y.o. female who presents today for an general visit to be evaluated for the following condition(s):  Chief Complaint   Patient presents with    GI Problem    Weight Loss    Constipation     Chronic, bloating.    Patient has a long history of irritable bowel with constipation.  Has been more bothersome of late as she is trying to diet and lose weight.  Her BMI is over 30.  She recently did Cologuard and results are pending.  No nausea no vomiting appetite is good    REVIEW OF SYSTEM      Review of Systems  Patient has no other healthcare issues.  No fever no sweats no chills. No chest pain nor shortness of breath. No nausea nor vomiting no diarrhea . No  complaints.  No edema issues.  Patient reports bowel movements may be every 2nd or 3rd day and usually they are hard in nature and small caliber.  No history of blood    REVIEWED INFORMATION      Allergies   Allergen Reactions    Aspirin Other (See Comments)    Azithromycin Hives    Cephalexin     Ibuprofen Other (See Comments)    Naproxen Other (See Comments)    Sulfamethoxazole-Trimethoprim        Current Outpatient Medications   Medication Sig Dispense Refill    buPROPion (WELLBUTRIN XL) 150 MG extended release tablet Take 1 tablet by mouth every morning      vitamin D 50 MCG (2000 UT) CAPS capsule Take 1 capsule by mouth daily      vitamin C (ASCORBIC ACID) 500 MG tablet Take 1 tablet by mouth daily      phentermine (ADIPEX-P) 37.5 MG tablet Take 1 tablet by mouth every morning (before breakfast) for 30 days. Max Daily Amount: 37.5 mg 30 tablet 0    linaclotide (LINZESS) 145 MCG capsule Take 1 capsule by mouth every morning (before breakfast) 30 capsule 1    pantoprazole (PROTONIX) 20 MG tablet TAKE ONE TABLET BY MOUTH EVERY MORNING BEFORE BREAKFAST 90 tablet 1    mirabegron (MYRBETRIQ) 50 MG TB24 Take 50 mg by

## 2025-07-31 LAB — NONINV COLON CA DNA+OCC BLD SCRN STL QL: NEGATIVE

## 2025-08-04 ENCOUNTER — OFFICE VISIT (OUTPATIENT)
Age: 65
End: 2025-08-04
Payer: COMMERCIAL

## 2025-08-04 VITALS — WEIGHT: 205 LBS | HEIGHT: 68 IN | BODY MASS INDEX: 31.07 KG/M2

## 2025-08-04 DIAGNOSIS — N39.41 URGE INCONTINENCE OF URINE: Primary | ICD-10-CM

## 2025-08-04 DIAGNOSIS — R35.0 FREQUENCY OF URINATION: ICD-10-CM

## 2025-08-04 DIAGNOSIS — Z01.818 PRE-OP EVALUATION: Primary | ICD-10-CM

## 2025-08-04 LAB
BILIRUBIN, POC: NORMAL
BLOOD URINE, POC: NORMAL
CLARITY, POC: CLEAR
COLOR, POC: YELLOW
GLUCOSE URINE, POC: NORMAL MG/DL
KETONES, POC: NORMAL
LEUKOCYTE EST, POC: NORMAL
NITRITE, POC: NORMAL
PH, POC: NORMAL
PROTEIN, POC: NORMAL MG/DL
SPECIFIC GRAVITY, POC: NORMAL
UROBILINOGEN, POC: NORMAL

## 2025-08-04 PROCEDURE — 81003 URINALYSIS AUTO W/O SCOPE: CPT | Performed by: SPECIALIST

## 2025-08-04 PROCEDURE — G8417 CALC BMI ABV UP PARAM F/U: HCPCS | Performed by: SPECIALIST

## 2025-08-04 PROCEDURE — 1036F TOBACCO NON-USER: CPT | Performed by: SPECIALIST

## 2025-08-04 PROCEDURE — 3017F COLORECTAL CA SCREEN DOC REV: CPT | Performed by: SPECIALIST

## 2025-08-04 PROCEDURE — 99214 OFFICE O/P EST MOD 30 MIN: CPT | Performed by: SPECIALIST

## 2025-08-04 PROCEDURE — G8427 DOCREV CUR MEDS BY ELIG CLIN: HCPCS | Performed by: SPECIALIST

## 2025-08-06 PROBLEM — N39.41 URGE URINARY INCONTINENCE: Status: ACTIVE | Noted: 2025-08-06

## 2025-08-22 PROBLEM — Z12.31 ENCOUNTER FOR SCREENING MAMMOGRAM FOR MALIGNANT NEOPLASM OF BREAST: Status: RESOLVED | Noted: 2025-07-23 | Resolved: 2025-08-22

## 2025-08-26 ENCOUNTER — E-VISIT (OUTPATIENT)
Dept: PRIMARY CARE CLINIC | Age: 65
End: 2025-08-26
Payer: COMMERCIAL

## 2025-08-26 DIAGNOSIS — J06.9 UPPER RESPIRATORY TRACT INFECTION, UNSPECIFIED TYPE: Primary | ICD-10-CM

## 2025-08-26 PROCEDURE — 99422 OL DIG E/M SVC 11-20 MIN: CPT | Performed by: NURSE PRACTITIONER

## 2025-08-26 RX ORDER — DOXYCYCLINE HYCLATE 100 MG
100 TABLET ORAL 2 TIMES DAILY
Qty: 20 TABLET | Refills: 0 | Status: SHIPPED | OUTPATIENT
Start: 2025-08-26 | End: 2025-09-05

## 2025-08-26 ASSESSMENT — LIFESTYLE VARIABLES
PACKS_PER_DAY: .5
SMOKING_STATUS: NO, I'M A FORMER SMOKER
SMOKING_YEARS: 3

## 2025-08-28 RX ORDER — MIRABEGRON 50 MG/1
50 TABLET, FILM COATED, EXTENDED RELEASE ORAL DAILY
Qty: 30 TABLET | Refills: 1 | Status: SHIPPED | OUTPATIENT
Start: 2025-08-28

## 2025-09-03 ENCOUNTER — OFFICE VISIT (OUTPATIENT)
Age: 65
End: 2025-09-03
Payer: COMMERCIAL

## 2025-09-03 VITALS
SYSTOLIC BLOOD PRESSURE: 162 MMHG | HEIGHT: 68 IN | WEIGHT: 203 LBS | BODY MASS INDEX: 30.77 KG/M2 | DIASTOLIC BLOOD PRESSURE: 98 MMHG | TEMPERATURE: 98 F | HEART RATE: 78 BPM | OXYGEN SATURATION: 98 %

## 2025-09-03 DIAGNOSIS — R63.5 WEIGHT GAIN: ICD-10-CM

## 2025-09-03 DIAGNOSIS — K58.1 IRRITABLE BOWEL SYNDROME WITH CONSTIPATION: Primary | ICD-10-CM

## 2025-09-03 PROCEDURE — 1036F TOBACCO NON-USER: CPT | Performed by: FAMILY MEDICINE

## 2025-09-03 PROCEDURE — G8427 DOCREV CUR MEDS BY ELIG CLIN: HCPCS | Performed by: FAMILY MEDICINE

## 2025-09-03 PROCEDURE — 3017F COLORECTAL CA SCREEN DOC REV: CPT | Performed by: FAMILY MEDICINE

## 2025-09-03 PROCEDURE — 99213 OFFICE O/P EST LOW 20 MIN: CPT | Performed by: FAMILY MEDICINE

## 2025-09-03 PROCEDURE — 3077F SYST BP >= 140 MM HG: CPT | Performed by: FAMILY MEDICINE

## 2025-09-03 PROCEDURE — 3080F DIAST BP >= 90 MM HG: CPT | Performed by: FAMILY MEDICINE

## 2025-09-03 PROCEDURE — G8417 CALC BMI ABV UP PARAM F/U: HCPCS | Performed by: FAMILY MEDICINE

## 2025-09-03 RX ORDER — SENNOSIDES 8.6 MG/1
TABLET ORAL
Qty: 60 TABLET | Refills: 1 | Status: SHIPPED | OUTPATIENT
Start: 2025-09-03

## 2025-09-03 SDOH — ECONOMIC STABILITY: FOOD INSECURITY: WITHIN THE PAST 12 MONTHS, THE FOOD YOU BOUGHT JUST DIDN'T LAST AND YOU DIDN'T HAVE MONEY TO GET MORE.: NEVER TRUE

## 2025-09-03 SDOH — ECONOMIC STABILITY: FOOD INSECURITY: WITHIN THE PAST 12 MONTHS, YOU WORRIED THAT YOUR FOOD WOULD RUN OUT BEFORE YOU GOT MONEY TO BUY MORE.: NEVER TRUE

## 2025-09-03 ASSESSMENT — PATIENT HEALTH QUESTIONNAIRE - PHQ9
SUM OF ALL RESPONSES TO PHQ QUESTIONS 1-9: 0
2. FEELING DOWN, DEPRESSED OR HOPELESS: NOT AT ALL
SUM OF ALL RESPONSES TO PHQ QUESTIONS 1-9: 0
1. LITTLE INTEREST OR PLEASURE IN DOING THINGS: NOT AT ALL
SUM OF ALL RESPONSES TO PHQ QUESTIONS 1-9: 0
SUM OF ALL RESPONSES TO PHQ QUESTIONS 1-9: 0

## (undated) DEVICE — MHPB CYSTO PACK-LF: Brand: MEDLINE INDUSTRIES, INC.

## (undated) DEVICE — GLOVE ORANGE PI 8   MSG9080

## (undated) DEVICE — Device: Brand: SINGLE USE INJECTOR NM-221C-0427

## (undated) DEVICE — BLUNTFILL: Brand: MONOJECT

## (undated) DEVICE — SYRINGE, LUER LOCK, 10ML: Brand: MEDLINE

## (undated) DEVICE — SOLUTION IRRIG 3000ML STRL H2O USP UROMATIC PLAS CONT

## (undated) DEVICE — SOLUTION IRRIG 1000ML STRL H2O USP PLAS POUR BTL

## (undated) DEVICE — SOLUTION SCRB 4OZ 4% CHG H2O AIDED FOR PREOPERATIVE SKIN